# Patient Record
Sex: FEMALE | Race: WHITE | NOT HISPANIC OR LATINO | Employment: FULL TIME | URBAN - METROPOLITAN AREA
[De-identification: names, ages, dates, MRNs, and addresses within clinical notes are randomized per-mention and may not be internally consistent; named-entity substitution may affect disease eponyms.]

---

## 2017-02-14 ENCOUNTER — ALLSCRIPTS OFFICE VISIT (OUTPATIENT)
Dept: OTHER | Facility: OTHER | Age: 57
End: 2017-02-14

## 2017-04-19 ENCOUNTER — GENERIC CONVERSION - ENCOUNTER (OUTPATIENT)
Dept: OTHER | Facility: OTHER | Age: 57
End: 2017-04-19

## 2017-07-14 DIAGNOSIS — Z98.84 BARIATRIC SURGERY STATUS: ICD-10-CM

## 2017-07-14 DIAGNOSIS — Z00.00 ENCOUNTER FOR GENERAL ADULT MEDICAL EXAMINATION WITHOUT ABNORMAL FINDINGS: ICD-10-CM

## 2017-07-14 DIAGNOSIS — I10 ESSENTIAL (PRIMARY) HYPERTENSION: ICD-10-CM

## 2017-07-14 DIAGNOSIS — R73.01 IMPAIRED FASTING GLUCOSE: ICD-10-CM

## 2017-07-14 DIAGNOSIS — K91.2 POSTSURGICAL MALABSORPTION, NOT ELSEWHERE CLASSIFIED: ICD-10-CM

## 2017-07-14 DIAGNOSIS — G47.33 OBSTRUCTIVE SLEEP APNEA: ICD-10-CM

## 2017-08-15 ENCOUNTER — APPOINTMENT (OUTPATIENT)
Dept: LAB | Facility: CLINIC | Age: 57
End: 2017-08-15
Payer: COMMERCIAL

## 2017-08-15 DIAGNOSIS — Z00.00 ENCOUNTER FOR GENERAL ADULT MEDICAL EXAMINATION WITHOUT ABNORMAL FINDINGS: ICD-10-CM

## 2017-08-15 DIAGNOSIS — K91.2 POSTSURGICAL MALABSORPTION, NOT ELSEWHERE CLASSIFIED: ICD-10-CM

## 2017-08-15 DIAGNOSIS — R73.01 IMPAIRED FASTING GLUCOSE: ICD-10-CM

## 2017-08-15 DIAGNOSIS — I10 ESSENTIAL (PRIMARY) HYPERTENSION: ICD-10-CM

## 2017-08-15 DIAGNOSIS — Z98.84 BARIATRIC SURGERY STATUS: ICD-10-CM

## 2017-08-15 DIAGNOSIS — G47.33 OBSTRUCTIVE SLEEP APNEA: ICD-10-CM

## 2017-08-15 LAB
25(OH)D3 SERPL-MCNC: 30.6 NG/ML (ref 30–100)
ALBUMIN SERPL BCP-MCNC: 3.4 G/DL (ref 3.5–5)
ALP SERPL-CCNC: 73 U/L (ref 46–116)
ALT SERPL W P-5'-P-CCNC: 30 U/L (ref 12–78)
ANION GAP SERPL CALCULATED.3IONS-SCNC: 5 MMOL/L (ref 4–13)
AST SERPL W P-5'-P-CCNC: 17 U/L (ref 5–45)
BILIRUB SERPL-MCNC: 0.44 MG/DL (ref 0.2–1)
BUN SERPL-MCNC: 13 MG/DL (ref 5–25)
CALCIUM SERPL-MCNC: 8.9 MG/DL (ref 8.3–10.1)
CHLORIDE SERPL-SCNC: 107 MMOL/L (ref 100–108)
CHOLEST SERPL-MCNC: 148 MG/DL (ref 50–200)
CO2 SERPL-SCNC: 30 MMOL/L (ref 21–32)
CREAT SERPL-MCNC: 0.64 MG/DL (ref 0.6–1.3)
ERYTHROCYTE [DISTWIDTH] IN BLOOD BY AUTOMATED COUNT: 18.7 % (ref 11.6–15.1)
EST. AVERAGE GLUCOSE BLD GHB EST-MCNC: 120 MG/DL
FERRITIN SERPL-MCNC: 10 NG/ML (ref 8–388)
FOLATE SERPL-MCNC: 12.8 NG/ML (ref 3.1–17.5)
GFR SERPL CREATININE-BSD FRML MDRD: 100 ML/MIN/1.73SQ M
GLUCOSE P FAST SERPL-MCNC: 84 MG/DL (ref 65–99)
HBA1C MFR BLD: 5.8 % (ref 4.2–6.3)
HCT VFR BLD AUTO: 38.3 % (ref 34.8–46.1)
HDLC SERPL-MCNC: 58 MG/DL (ref 40–60)
HGB BLD-MCNC: 12.1 G/DL (ref 11.5–15.4)
LDLC SERPL CALC-MCNC: 76 MG/DL (ref 0–100)
MCH RBC QN AUTO: 27.3 PG (ref 26.8–34.3)
MCHC RBC AUTO-ENTMCNC: 31.6 G/DL (ref 31.4–37.4)
MCV RBC AUTO: 87 FL (ref 82–98)
PLATELET # BLD AUTO: 221 THOUSANDS/UL (ref 149–390)
PMV BLD AUTO: 9.5 FL (ref 8.9–12.7)
POTASSIUM SERPL-SCNC: 3.6 MMOL/L (ref 3.5–5.3)
PROT SERPL-MCNC: 7.1 G/DL (ref 6.4–8.2)
PTH-INTACT SERPL-MCNC: 32.9 PG/ML (ref 14–72)
RBC # BLD AUTO: 4.43 MILLION/UL (ref 3.81–5.12)
SODIUM SERPL-SCNC: 142 MMOL/L (ref 136–145)
TRIGL SERPL-MCNC: 71 MG/DL
VIT B12 SERPL-MCNC: 382 PG/ML (ref 100–900)
WBC # BLD AUTO: 4.44 THOUSAND/UL (ref 4.31–10.16)

## 2017-08-15 PROCEDURE — 80053 COMPREHEN METABOLIC PANEL: CPT

## 2017-08-15 PROCEDURE — 83970 ASSAY OF PARATHORMONE: CPT

## 2017-08-15 PROCEDURE — 85027 COMPLETE CBC AUTOMATED: CPT

## 2017-08-15 PROCEDURE — 82306 VITAMIN D 25 HYDROXY: CPT

## 2017-08-15 PROCEDURE — 83036 HEMOGLOBIN GLYCOSYLATED A1C: CPT

## 2017-08-15 PROCEDURE — 36415 COLL VENOUS BLD VENIPUNCTURE: CPT

## 2017-08-15 PROCEDURE — 84590 ASSAY OF VITAMIN A: CPT

## 2017-08-15 PROCEDURE — 80061 LIPID PANEL: CPT

## 2017-08-15 PROCEDURE — 82728 ASSAY OF FERRITIN: CPT

## 2017-08-15 PROCEDURE — 82607 VITAMIN B-12: CPT

## 2017-08-15 PROCEDURE — 84425 ASSAY OF VITAMIN B-1: CPT

## 2017-08-15 PROCEDURE — 82746 ASSAY OF FOLIC ACID SERUM: CPT

## 2017-08-18 LAB
VIT A SERPL-MCNC: 40 UG/DL (ref 20–65)
VIT B1 BLD-SCNC: 100.3 NMOL/L (ref 66.5–200)

## 2017-08-21 ENCOUNTER — ALLSCRIPTS OFFICE VISIT (OUTPATIENT)
Dept: OTHER | Facility: OTHER | Age: 57
End: 2017-08-21

## 2017-09-21 ENCOUNTER — ALLSCRIPTS OFFICE VISIT (OUTPATIENT)
Dept: OTHER | Facility: OTHER | Age: 57
End: 2017-09-21

## 2018-01-09 NOTE — PROGRESS NOTES
Message  Attended 9 month follow up meeting  Addressed both behavioral and dietary issues  Group discussion included the impact of tobacco use, alcohol use, psychiatric medications, relationships, body image and self esteem issues as it pertains to the weight loss surgery patient  During group discussion, reviewed vitamin recommendations, protein recommendations, portion sizes, balancing diet to include healthy carbohydrates, importance of exercise, and the bariatric rules for success  Overall pleased with weight loss and improved quality of life  Active Problems    1  Acute left-sided low back pain without sciatica (724 2) (M54 5)   2  Adjustment disorder with depressed mood (309 0) (F43 21)   3  Benign essential hypertension (401 1) (I10)   4  Colon cancer screening (V76 51) (Z12 11)   5  Foot pain, unspecified laterality   6  Joint pain (719 40) (M25 50)   7  Obesity (278 00) (E66 9)   8  Obesity (BMI 30-39 9) (278 00) (E66 9)   9  Obstructive sleep apnea (327 23) (G47 33)   10  Postgastrectomy malabsorption (579 3) (K91 2,Z90 3)   11  Preop cardiovascular exam (V72 81) (Z01 810)   12  Status post gastric bypass for obesity (V45 86) (Z98 84)    Current Meds   1  Bariatric Fusion Oral Tablet Chewable; Therapy: (Recorded:09Nrz6840) to Recorded   2  Omeprazole 20 MG Oral Capsule Delayed Release; TAKE 1 CAPSULE DAILY; Therapy: 50KWI7014 to (Evaluate:14Tsl8661)  Requested for: 50Kzd5456; Last   Rx:21Qlw8038; Status: ACTIVE - Renewal Denied Ordered    Allergies    1  No Known Drug Allergies    Signatures   Electronically signed by : JUANITA Hwang RD;  Apr 27 2017 12:23PM EST                       (Author)

## 2018-01-10 NOTE — RESULT NOTES
Dear Wil Gomez,   Your test results have returned and are listed below:      Discussion/Summary  Your results show some abnormalities  Your hemoglobin A1c was 5 7% ( average blood sugar of 117 mg/dl), indicating pre diabetes   Weight loss surgery should improve this condition  Your lab levels will be checked again after surgery  Your other levels were within acceptable limits  Please keep your regularly scheduled follow-up appointment  If you do not have a follow-up scheduled, please call the office to schedule a follow-up visit  If you have any questions, please don't hesitate to call the office     Sincerely,      Signatures   Electronically signed by : Claudette Abler, LD; Apr 15 2016  3:03PM EST                       (Author)    Electronically signed by : MATT Esparza ; Apr 21 2016 12:20PM EST                       (Validation)

## 2018-01-12 NOTE — PROGRESS NOTES
Message  Patient called and requested clarification on the D/A evaluation  NV      Active Problems    1  Adjustment disorder with depressed mood (309 0) (F43 21)   2  Benign essential hypertension (401 1) (I10)   3  Foot pain, unspecified laterality (729 5) (M79 673)   4  Impaired fasting glucose (790 21) (R73 01)   5  Joint pain (719 40) (M25 50)   6  Morbid obesity (278 01) (E66 01)   7  Obesity (278 00) (E66 9)   8  Obstructive sleep apnea (327 23) (G47 33)    Current Meds   1  Diclofenac Sodium 75 MG Oral Tablet Delayed Release; take 1 tablet twice a day; Therapy: 35PAB6755 to (Evaluate:59Ohd4194)  Requested for: 20Wba0650; Last   Rx:19Nfj5923 Ordered   2  Lisinopril 40 MG Oral Tablet; Take 1 tablet daily; Therapy: 86THZ1286 to (Evaluate:90Hme3777)  Requested for: 64Zcl8127; Last   Rx:82Can2354 Ordered    Allergies    1   No Known Drug Allergies    Signatures   Electronically signed by : LALIT Bruce; Jan 14 2016  3:25PM EST                       (Author)

## 2018-01-12 NOTE — PROGRESS NOTES
Message  D/A received and reviewed  Patient cleared by evaluator  I called patient to inform her our office received evaluation and patient is cleared to proceed  I mentioned to patient coordiantor will reach out to discuss next step  NV      Active Problems    1  Adjustment disorder with depressed mood (309 0) (F43 21)   2  Benign essential hypertension (401 1) (I10)   3  Foot pain, unspecified laterality (729 5) (M79 673)   4  Impaired fasting glucose (790 21) (R73 01)   5  Joint pain (719 40) (M25 50)   6  Morbid obesity (278 01) (E66 01)   7  Obesity (278 00) (E66 9)   8  Obstructive sleep apnea (327 23) (G47 33)    Current Meds   1  Diclofenac Sodium 75 MG Oral Tablet Delayed Release; take 1 tablet twice a day; Therapy: 49MSY4310 to (Evaluate:22Kqh1637)  Requested for: 01Jbx9797; Last   Rx:63Jpg0942 Ordered   2  Lisinopril 40 MG Oral Tablet; Take 1 tablet daily; Therapy: 70MMY6557 to (Evaluate:33Qge1980)  Requested for: 41Pqv2399; Last   Rx:24Ehk4467 Ordered    Allergies    1   No Known Drug Allergies    Signatures   Electronically signed by : Ronnell Ayala MSWLCFILIPE; Feb  3 2016 11:46AM EST                       (Author)

## 2018-01-13 VITALS
BODY MASS INDEX: 27.56 KG/M2 | SYSTOLIC BLOOD PRESSURE: 132 MMHG | HEIGHT: 66 IN | WEIGHT: 171.5 LBS | TEMPERATURE: 98.4 F | HEART RATE: 71 BPM | DIASTOLIC BLOOD PRESSURE: 78 MMHG | OXYGEN SATURATION: 97 %

## 2018-01-13 VITALS
HEART RATE: 70 BPM | WEIGHT: 175 LBS | BODY MASS INDEX: 28.12 KG/M2 | RESPIRATION RATE: 18 BRPM | TEMPERATURE: 98 F | HEIGHT: 66 IN | DIASTOLIC BLOOD PRESSURE: 70 MMHG | SYSTOLIC BLOOD PRESSURE: 122 MMHG

## 2018-01-14 VITALS
BODY MASS INDEX: 32.62 KG/M2 | WEIGHT: 203 LBS | RESPIRATION RATE: 18 BRPM | HEART RATE: 72 BPM | DIASTOLIC BLOOD PRESSURE: 70 MMHG | TEMPERATURE: 98.9 F | SYSTOLIC BLOOD PRESSURE: 130 MMHG | HEIGHT: 66 IN

## 2018-01-14 NOTE — PROGRESS NOTES
Message  Outreach phone call How is patient coming along with pre-op weight loss and liquid diet? Patient said she is doing fine and no questions or concerns  Remind patient she will be weighed the morning of surgery and encouraged to reach out to staff if in need of support  NV      Active Problems    1  Acute left-sided low back pain without sciatica (724 2) (M54 5)   2  Adjustment disorder with depressed mood (309 0) (F43 21)   3  Benign essential hypertension (401 1) (I10)   4  Foot pain, unspecified laterality   5  Impaired fasting glucose (790 21) (R73 01)   6  Joint pain (719 40) (M25 50)   7  Morbid obesity (278 01) (E66 01)   8  Obesity (278 00) (E66 9)   9  Obstructive sleep apnea (327 23) (G47 33)   10  Preop cardiovascular exam (V72 81) (Z01 810)    Current Meds   1  Diclofenac Sodium 75 MG Oral Tablet Delayed Release; take 1 tablet twice a day; Therapy: 81KWW3792 to (Evaluate:51Hqr6010)  Requested for: 89Qvb3926; Last   Rx:62Rar4617 Ordered   2  Lisinopril 40 MG Oral Tablet; Take 1 tablet daily; Therapy: 71JRF4631 to (Last Rx:98Lgh5251)  Requested for: 38Xxp2671 Ordered   3  Omeprazole 20 MG Oral Capsule Delayed Release; TAKE 1 CAPSULE DAILY; Therapy: 63GBQ7568 to (Jc Dao)  Requested for: 93FEU6208; Last   Rx:16Khn4886 Ordered    Allergies    1   No Known Drug Allergies    Signatures   Electronically signed by : LALIT Katz; Jul 20 2016  8:31AM EST                       (Author)

## 2018-01-16 NOTE — RESULT NOTES
Dear Severino Fay,   Your test results have returned and are listed below:      Discussion/Summary  Your labs are all within acceptable limits  Your hemoglobin A1c is now normal, and you are no longer prediabetic  Please keep your regularly scheduled follow-up appointment  If you do not have a follow-up scheduled, please call the office to schedule a follow-up visit  If you have any questions, please don't hesitate to call the office     Sincerely,      Signatures   Electronically signed by : RIAZ Covington; Nov 4 2016  3:18PM EST                       (Author)    Electronically signed by : MATT Wilks ; Nov 10 2016 10:50AM EST                       (Validation)

## 2018-01-16 NOTE — PROGRESS NOTES
Message  Outreach phone call; how is patient coming along with D/A evaluation? She scheduled appointment for 2/3/44 with [facility]  I requested patient sign release and have them forward evaluation to our office  NV      Active Problems    1  Adjustment disorder with depressed mood (309 0) (F43 21)   2  Benign essential hypertension (401 1) (I10)   3  Foot pain, unspecified laterality (729 5) (M79 673)   4  Impaired fasting glucose (790 21) (R73 01)   5  Joint pain (719 40) (M25 50)   6  Morbid obesity (278 01) (E66 01)   7  Obesity (278 00) (E66 9)   8  Obstructive sleep apnea (327 23) (G47 33)    Current Meds   1  Diclofenac Sodium 75 MG Oral Tablet Delayed Release; take 1 tablet twice a day; Therapy: 54NYQ4945 to (Evaluate:05Tau2454)  Requested for: 57Nsn8380; Last   Rx:96Ykt6179 Ordered   2  Lisinopril 40 MG Oral Tablet; Take 1 tablet daily; Therapy: 12WIZ3512 to (Evaluate:14Lqd3123)  Requested for: 51Zyi3746; Last   Rx:08Tno8344 Ordered    Allergies    1   No Known Drug Allergies    Signatures   Electronically signed by : LALIT Jeffries; Jan 22 2016 11:20AM EST                       (Author)

## 2018-01-17 NOTE — MISCELLANEOUS
Assessment    1  Morbid obesity (278 01) (E66 01)   2  Benign essential hypertension (401 1) (I10)   3  Status post gastric bypass for obesity (V45 86) (Z33 72)    Discussion/Summary  Discussion Summary:   1) Obesity she has lost 26 lbs since her surgery  I encouraged her to do small portions  2) sp gastric bypass  doing well post op  She will follow with her surgeons and will have lab evaluation for hypovitaminosis periodically  3) HTN nl bp off meds will follow off meds  4) José Miguel continue with cpap  Counseling Documentation With Imm: The patient was counseled regarding instructions for management, risk factor reductions  Chief Complaint  Chief Complaint Free Text Note Form: Follow up Gastric bypass      History of Present Illness  TCM Communication [facility]: The patient is being contacted for follow-up after hospitalization  She was hospitalized at [place] [address]  The date of discharge: 7/27/16  She was discharged to home  Medications reviewed and updated today  Communication performed and completed by   HPI: Patient presents about 13 days after her recent chuck en y for obesity  She had a rough time post op but she feels better each day  She mostly dealt with problems with pain and had a lot of bloating and gas when she ate milk products  She is getting used to her change in eating habits  She has been off her bp meds  Review of Systems  Complete-Female:   Constitutional: as noted in HPI  Eyes: No complaints of eye pain, no red eyes, no eyesight problems, no discharge, no dry eyes, no itching of eyes  ENT: no complaints of earache, no loss of hearing, no nose bleeds, no nasal discharge, no sore throat, no hoarseness  Cardiovascular: No complaints of slow heart rate, no fast heart rate, no chest pain, no palpitations, no leg claudication, no lower extremity edema  Respiratory: No complaints of shortness of breath, no wheezing, no cough, no SOB on exertion, no orthopnea, no PND     Gastrointestinal: as noted in HPI  Genitourinary: No complaints of dysuria, no incontinence, no pelvic pain, no dysmenorrhea, no vaginal discharge or bleeding  Musculoskeletal: No complaints of arthralgias, no myalgias, no joint swelling or stiffness, no limb pain or swelling  Integumentary: No complaints of skin rash or lesions, no itching, no skin wounds, no breast pain or lump  Neurological: No complaints of headache, no confusion, no convulsions, no numbness, no dizziness or fainting, no tingling, no limb weakness, no difficulty walking  Psychiatric: Not suicidal, no sleep disturbance, no anxiety or depression, no change in personality, no emotional problems  Endocrine: No complaints of proptosis, no hot flashes, no muscle weakness, no deepening of the voice, no feelings of weakness  Hematologic/Lymphatic: No complaints of swollen glands, no swollen glands in the neck, does not bleed easily, does not bruise easily  ROS Reviewed:   ROS reviewed  Active Problems    1  Acute left-sided low back pain without sciatica (724 2) (M54 5)   2  Adjustment disorder with depressed mood (309 0) (F43 21)   3  Benign essential hypertension (401 1) (I10)   4  Foot pain, unspecified laterality   5  Impaired fasting glucose (790 21) (R73 01)   6  Joint pain (719 40) (M25 50)   7  Morbid obesity (278 01) (E66 01)   8  Obesity (278 00) (E66 9)   9  Obstructive sleep apnea (327 23) (G47 33)   10  Preop cardiovascular exam (V72 81) (Z01 810)    Past Medical History    1  History of Encounter for screening mammogram for malignant neoplasm of breast   (V76 12) (Z12 31)   2  History of hypertension (V12 59) (Z86 79)   3  History of obesity (V13 89) (Z86 39)   4  History of obesity (V13 89) (Z86 39)   5  History of sleep apnea (V13 89) (Z87 09)    Surgical History    1  History of  Section  Surgical History Reviewed: The surgical history was reviewed and updated today  Family History  Mother    1   No pertinent family history  Family History    2  No pertinent family history  Family History Reviewed: The family history was reviewed and updated today  Social History    · Denied: History of Drug Use   · Former smoker (V15 82) (M07 728)   · Lives with spouse   ·    · No alcohol use   · No drug use   · Occupation  Social History Reviewed: The social history was reviewed and updated today  Current Meds   1  Diclofenac Sodium 75 MG Oral Tablet Delayed Release; take 1 tablet twice a day; Therapy: 33LFN8511 to (Evaluate:10Zmk7853)  Requested for: 75Ght6710; Last   Rx:22Eof6686 Ordered   2  Lisinopril 40 MG Oral Tablet; Take 1 tablet daily; Therapy: 08OQG4260 to (Last Rx:06Jun2016)  Requested for: 06Jun2016 Ordered   3  Omeprazole 20 MG Oral Capsule Delayed Release; TAKE 1 CAPSULE DAILY; Therapy: 03SUE7913 to (Suki Flair)  Requested for: 30XTO6964; Last   Rx:46Jdp5338 Ordered  Medication List Reviewed: The medication list was reviewed and updated today  Allergies    1  No Known Drug Allergies    Physical Exam    Constitutional   General appearance: No acute distress, well appearing and well nourished  Eyes   Conjunctiva and lids: No swelling, erythema or discharge  Pupils and irises: Equal, round and reactive to light  Ears, Nose, Mouth, and Throat   External inspection of ears and nose: Normal     Otoscopic examination: Tympanic membranes translucent with normal light reflex  Canals patent without erythema  Nasal mucosa, septum, and turbinates: Normal without edema or erythema  Oropharynx: Normal with no erythema, edema, exudate or lesions  Pulmonary   Respiratory effort: No increased work of breathing or signs of respiratory distress  Auscultation of lungs: Clear to auscultation  Cardiovascular   Palpation of heart: Normal PMI, no thrills  Auscultation of heart: Normal rate and rhythm, normal S1 and S2, without murmurs      Examination of extremities for edema and/or varicosities: Normal     Carotid pulses: Normal     Abdomen   Abdomen: Non-tender, no masses  Liver and spleen: No hepatomegaly or splenomegaly  Lymphatic   Palpation of lymph nodes in neck: No lymphadenopathy  Musculoskeletal   Gait and station: Normal     Digits and nails: Normal without clubbing or cyanosis  Inspection/palpation of joints, bones, and muscles: Normal     Skin   Skin and subcutaneous tissue: Normal without rashes or lesions  Neurologic   Cranial nerves: Cranial nerves 2-12 intact  Reflexes: 2+ and symmetric  Sensation: No sensory loss  Psychiatric   Orientation to person, place, and time: Normal     Mood and affect: Normal          Health Management  Health Maintenance   COLONOSCOPY; every 1 year; Last 59IAL7989; Next Due: 26GXD8968; Overdue  Digital Bilateral Diagnostic Mammogram With CAD; every 1 year; Next Due: 58Wie7269; Overdue    Future Appointments    Date/Time Provider Specialty Site   11/21/2016 05:30 PM [facility] Palmer   [facility]   09/13/2016 03:00 PM Mally Parisi, [facility] General Surgery [facility]     Signatures   Electronically signed by : MATT Branch ; Aug 10 2016 12:15PM EST                       (Author)

## 2018-01-18 NOTE — MISCELLANEOUS
Message  7/29/2016 @ 1100 - post op follow up phone call completed  Pt is sipping liquids, using IS as instructed, reinforced importance of using IS to help prevent pneumonia  Ambulating about home without difficulty  Pain controlled with analgesia  Reaffirmed examples of liquid diet over the next week  Pt stated understanding about discharge instructions and medication adjustments  Pt educated on [address] [facility]  Follow up appt with surgeon scheduled for next week  Instructed to call with any additional questions or concerns  Active Problems    1  Acute left-sided low back pain without sciatica (724 2) (M54 5)   2  Adjustment disorder with depressed mood (309 0) (F43 21)   3  Benign essential hypertension (401 1) (I10)   4  Foot pain, unspecified laterality   5  Impaired fasting glucose (790 21) (R73 01)   6  Joint pain (719 40) (M25 50)   7  Morbid obesity (278 01) (E66 01)   8  Obesity (278 00) (E66 9)   9  Obstructive sleep apnea (327 23) (G47 33)   10  Preop cardiovascular exam (V72 81) (Z01 810)    Current Meds   1  Diclofenac Sodium 75 MG Oral Tablet Delayed Release; take 1 tablet twice a day; Therapy: 48IWU6418 to (Evaluate:93Zjc0329)  Requested for: 70Pco2329; Last   Rx:49Omw3221 Ordered   2  Lisinopril 40 MG Oral Tablet; Take 1 tablet daily; Therapy: 37TFU6672 to (Last Rx:97Vfa4158)  Requested for: 06Jun2016 Ordered   3  Omeprazole 20 MG Oral Capsule Delayed Release; TAKE 1 CAPSULE DAILY; Therapy: 15IPA4676 to (Mckenzie Shields)  Requested for: 83EEE7951; Last   Rx:30Tkd5402 Ordered    Allergies    1   No Known Drug Allergies    Signatures   Electronically signed by : Unique Hummel, ; Jul 29 2016 11:12AM EST                       (Author)

## 2018-03-15 ENCOUNTER — OFFICE VISIT (OUTPATIENT)
Dept: INTERNAL MEDICINE CLINIC | Facility: CLINIC | Age: 58
End: 2018-03-15
Payer: COMMERCIAL

## 2018-03-15 VITALS
HEART RATE: 62 BPM | TEMPERATURE: 98.2 F | BODY MASS INDEX: 29.49 KG/M2 | DIASTOLIC BLOOD PRESSURE: 78 MMHG | WEIGHT: 177 LBS | OXYGEN SATURATION: 99 % | SYSTOLIC BLOOD PRESSURE: 148 MMHG | HEIGHT: 65 IN

## 2018-03-15 DIAGNOSIS — J02.9 PHARYNGITIS, UNSPECIFIED ETIOLOGY: ICD-10-CM

## 2018-03-15 DIAGNOSIS — J06.9 VIRAL UPPER RESPIRATORY TRACT INFECTION: ICD-10-CM

## 2018-03-15 DIAGNOSIS — E66.01 MORBID (SEVERE) OBESITY DUE TO EXCESS CALORIES (HCC): ICD-10-CM

## 2018-03-15 DIAGNOSIS — I10 BENIGN ESSENTIAL HYPERTENSION: ICD-10-CM

## 2018-03-15 DIAGNOSIS — B35.9 TINEA: Primary | ICD-10-CM

## 2018-03-15 LAB — S PYO AG THROAT QL: NEGATIVE

## 2018-03-15 PROCEDURE — 99214 OFFICE O/P EST MOD 30 MIN: CPT | Performed by: PHYSICIAN ASSISTANT

## 2018-03-15 PROCEDURE — 87880 STREP A ASSAY W/OPTIC: CPT | Performed by: PHYSICIAN ASSISTANT

## 2018-03-15 RX ORDER — CLOTRIMAZOLE AND BETAMETHASONE DIPROPIONATE 10; .64 MG/G; MG/G
CREAM TOPICAL 2 TIMES DAILY
Qty: 30 G | Refills: 3 | Status: SHIPPED | OUTPATIENT
Start: 2018-03-15 | End: 2021-01-12

## 2018-03-15 RX ORDER — AZITHROMYCIN 250 MG/1
TABLET, FILM COATED ORAL
Qty: 6 TABLET | Refills: 0 | Status: SHIPPED | OUTPATIENT
Start: 2018-03-15 | End: 2018-03-19

## 2018-03-15 NOTE — PROGRESS NOTES
Assessment/Plan:  Empiric antibiotics she will continue with over-the-counter cold remedies  She will return if she worsens or gets wheezy  Her weight is staying down pretty well after her bariatric surgery  Her life is returning to normal following the death of her   No problem-specific Assessment & Plan notes found for this encounter  Diagnoses and all orders for this visit:    Tinea  -     clotrimazole-betamethasone (LOTRISONE) 1-0 05 % cream; Apply topically 2 (two) times a day    Viral upper respiratory tract infection  -     azithromycin (ZITHROMAX) 250 mg tablet; Take 2 tablets day 1 then  1 a day for 4 days    Benign essential hypertension  -     CBC and differential; Future  -     Comprehensive metabolic panel; Future  -     HEMOGLOBIN A1C W/ EAG ESTIMATION; Future  -     Lipid panel; Future  -     TSH, 3rd generation; Future  -     UA w Reflex to Microscopic w Reflex to Culture; Future    Morbid (severe) obesity due to excess calories (HCC)  -     CBC and differential; Future  -     Comprehensive metabolic panel; Future  -     HEMOGLOBIN A1C W/ EAG ESTIMATION; Future  -     Lipid panel; Future  -     TSH, 3rd generation; Future  -     UA w Reflex to Microscopic w Reflex to Culture; Future    Pharyngitis, unspecified etiology  -     POCT rapid strepA    Other orders  -     Multiple Vitamins-Minerals (BARIATRIC FUSION PO); Take by mouth  -     POCT rapid strepA          Subjective:      Patient ID: Eliu Fowler is a 65 y o  female  Fever aching sore throat started abruptly 5 days ago she now has a loose cough of greenish phlegm no shortness of breath or wheezing sore throat improving a little she is now somewhat hoarse  No trouble swallowing no chest pain or hemoptysis fever and aching or improving      Sore Throat    Associated symptoms include ear pain   Pertinent negatives include no abdominal pain, congestion, coughing, diarrhea, drooling, ear discharge, headaches, neck pain, shortness of breath, stridor, trouble swallowing or vomiting  Earache    Associated symptoms include a sore throat  Pertinent negatives include no abdominal pain, coughing, diarrhea, ear discharge, headaches, hearing loss, neck pain, rash, rhinorrhea or vomiting  The following portions of the patient's history were reviewed and updated as appropriate: allergies, current medications, past family history, past medical history, past social history, past surgical history and problem list     Review of Systems   Constitutional: Negative for activity change, appetite change, chills, diaphoresis, fatigue, fever and unexpected weight change  HENT: Positive for ear pain and sore throat  Negative for congestion, dental problem, drooling, ear discharge, facial swelling, hearing loss, nosebleeds, postnasal drip, rhinorrhea, sinus pain, sinus pressure, sneezing, tinnitus, trouble swallowing and voice change  Eyes: Negative for photophobia, pain, discharge, redness, itching and visual disturbance  Respiratory: Negative for apnea, cough, choking, chest tightness, shortness of breath, wheezing and stridor  Cardiovascular: Negative for chest pain, palpitations and leg swelling  Gastrointestinal: Negative for abdominal distention, abdominal pain, anal bleeding, blood in stool, constipation, diarrhea, nausea, rectal pain and vomiting  Endocrine: Negative for cold intolerance, heat intolerance, polydipsia, polyphagia and polyuria  Genitourinary: Negative for decreased urine volume, difficulty urinating, dysuria, enuresis, flank pain, frequency, genital sores, hematuria and urgency  Musculoskeletal: Negative for arthralgias, back pain, gait problem, joint swelling, myalgias, neck pain and neck stiffness  Skin: Negative for color change, pallor, rash and wound     Neurological: Negative for dizziness, tremors, seizures, syncope, facial asymmetry, speech difficulty, weakness, light-headedness, numbness and headaches  Hematological: Negative for adenopathy  Does not bruise/bleed easily  Psychiatric/Behavioral: Negative for agitation, behavioral problems, confusion, decreased concentration, dysphoric mood, hallucinations, self-injury, sleep disturbance and suicidal ideas  The patient is not nervous/anxious and is not hyperactive  Objective:    /78 (BP Location: Left arm, Patient Position: Sitting)   Pulse 62   Temp 98 2 °F (36 8 °C)   Ht 5' 5" (1 651 m)   Wt 80 3 kg (177 lb)   SpO2 99%   BMI 29 45 kg/m²      Physical Exam   Constitutional: She is oriented to person, place, and time  She appears well-developed  HENT:   Head: Normocephalic  Right Ear: External ear normal    Left Ear: External ear normal    Mouth/Throat: Oropharynx is clear and moist  Oropharyngeal exudate: Somewhat injected  Eyes: Conjunctivae are normal  Pupils are equal, round, and reactive to light  Neck: Neck supple  No thyromegaly present  Cardiovascular: Normal rate, regular rhythm, normal heart sounds and intact distal pulses  Pulmonary/Chest: Effort normal and breath sounds normal  No respiratory distress (Few scattered rhonchi)  She has no wheezes  She has no rales  She exhibits no tenderness  Abdominal: Soft  Bowel sounds are normal    Musculoskeletal: Normal range of motion  Neurological: She is alert and oriented to person, place, and time  She has normal reflexes  Skin: Skin is warm and dry     Fungal changes both toenails

## 2018-05-16 ENCOUNTER — TELEPHONE (OUTPATIENT)
Dept: BARIATRICS | Facility: CLINIC | Age: 58
End: 2018-05-16

## 2018-05-16 NOTE — TELEPHONE ENCOUNTER
LM for patient to call back if she would like to schedule her follow-up appointments at the [facility] office

## 2018-07-26 ENCOUNTER — TELEPHONE (OUTPATIENT)
Dept: BARIATRICS | Facility: CLINIC | Age: 58
End: 2018-07-26

## 2018-08-17 ENCOUNTER — TELEPHONE (OUTPATIENT)
Dept: BARIATRICS | Facility: CLINIC | Age: 58
End: 2018-08-17

## 2018-08-22 ENCOUNTER — APPOINTMENT (OUTPATIENT)
Dept: LAB | Facility: CLINIC | Age: 58
End: 2018-08-22
Payer: COMMERCIAL

## 2018-08-22 DIAGNOSIS — E66.01 MORBID (SEVERE) OBESITY DUE TO EXCESS CALORIES (HCC): ICD-10-CM

## 2018-08-22 DIAGNOSIS — I10 BENIGN ESSENTIAL HYPERTENSION: ICD-10-CM

## 2018-08-22 LAB
ALBUMIN SERPL BCP-MCNC: 3.5 G/DL (ref 3.5–5)
ALP SERPL-CCNC: 69 U/L (ref 46–116)
ALT SERPL W P-5'-P-CCNC: 26 U/L (ref 12–78)
ANION GAP SERPL CALCULATED.3IONS-SCNC: 5 MMOL/L (ref 4–13)
AST SERPL W P-5'-P-CCNC: 17 U/L (ref 5–45)
BACTERIA UR QL AUTO: ABNORMAL /HPF
BASOPHILS # BLD AUTO: 0.04 THOUSANDS/ΜL (ref 0–0.1)
BASOPHILS NFR BLD AUTO: 1 % (ref 0–1)
BILIRUB SERPL-MCNC: 0.35 MG/DL (ref 0.2–1)
BILIRUB UR QL STRIP: NEGATIVE
BUN SERPL-MCNC: 13 MG/DL (ref 5–25)
CALCIUM SERPL-MCNC: 8.6 MG/DL (ref 8.3–10.1)
CHLORIDE SERPL-SCNC: 103 MMOL/L (ref 100–108)
CHOLEST SERPL-MCNC: 172 MG/DL (ref 50–200)
CLARITY UR: CLEAR
CO2 SERPL-SCNC: 30 MMOL/L (ref 21–32)
COLOR UR: YELLOW
CREAT SERPL-MCNC: 0.66 MG/DL (ref 0.6–1.3)
EOSINOPHIL # BLD AUTO: 0.09 THOUSAND/ΜL (ref 0–0.61)
EOSINOPHIL NFR BLD AUTO: 2 % (ref 0–6)
ERYTHROCYTE [DISTWIDTH] IN BLOOD BY AUTOMATED COUNT: 14.2 % (ref 11.6–15.1)
EST. AVERAGE GLUCOSE BLD GHB EST-MCNC: 114 MG/DL
GFR SERPL CREATININE-BSD FRML MDRD: 98 ML/MIN/1.73SQ M
GLUCOSE P FAST SERPL-MCNC: 82 MG/DL (ref 65–99)
GLUCOSE UR STRIP-MCNC: NEGATIVE MG/DL
HBA1C MFR BLD: 5.6 % (ref 4.2–6.3)
HCT VFR BLD AUTO: 40.2 % (ref 34.8–46.1)
HDLC SERPL-MCNC: 59 MG/DL (ref 40–60)
HGB BLD-MCNC: 12.7 G/DL (ref 11.5–15.4)
HGB UR QL STRIP.AUTO: NEGATIVE
HYALINE CASTS #/AREA URNS LPF: ABNORMAL /LPF
IMM GRANULOCYTES # BLD AUTO: 0.01 THOUSAND/UL (ref 0–0.2)
IMM GRANULOCYTES NFR BLD AUTO: 0 % (ref 0–2)
KETONES UR STRIP-MCNC: NEGATIVE MG/DL
LDLC SERPL CALC-MCNC: 95 MG/DL (ref 0–100)
LEUKOCYTE ESTERASE UR QL STRIP: ABNORMAL
LYMPHOCYTES # BLD AUTO: 2.14 THOUSANDS/ΜL (ref 0.6–4.47)
LYMPHOCYTES NFR BLD AUTO: 42 % (ref 14–44)
MCH RBC QN AUTO: 28.2 PG (ref 26.8–34.3)
MCHC RBC AUTO-ENTMCNC: 31.6 G/DL (ref 31.4–37.4)
MCV RBC AUTO: 89 FL (ref 82–98)
MONOCYTES # BLD AUTO: 0.39 THOUSAND/ΜL (ref 0.17–1.22)
MONOCYTES NFR BLD AUTO: 8 % (ref 4–12)
NEUTROPHILS # BLD AUTO: 2.39 THOUSANDS/ΜL (ref 1.85–7.62)
NEUTS SEG NFR BLD AUTO: 47 % (ref 43–75)
NITRITE UR QL STRIP: NEGATIVE
NON-SQ EPI CELLS URNS QL MICRO: ABNORMAL /HPF
NONHDLC SERPL-MCNC: 113 MG/DL
NRBC BLD AUTO-RTO: 0 /100 WBCS
PH UR STRIP.AUTO: 6.5 [PH] (ref 4.5–8)
PLATELET # BLD AUTO: 251 THOUSANDS/UL (ref 149–390)
PMV BLD AUTO: 9.3 FL (ref 8.9–12.7)
POTASSIUM SERPL-SCNC: 3.7 MMOL/L (ref 3.5–5.3)
PROT SERPL-MCNC: 7.1 G/DL (ref 6.4–8.2)
PROT UR STRIP-MCNC: NEGATIVE MG/DL
RBC # BLD AUTO: 4.5 MILLION/UL (ref 3.81–5.12)
RBC #/AREA URNS AUTO: ABNORMAL /HPF
SODIUM SERPL-SCNC: 138 MMOL/L (ref 136–145)
SP GR UR STRIP.AUTO: 1.02 (ref 1–1.03)
TRIGL SERPL-MCNC: 89 MG/DL
TSH SERPL DL<=0.05 MIU/L-ACNC: 2.8 UIU/ML (ref 0.36–3.74)
UROBILINOGEN UR QL STRIP.AUTO: 0.2 E.U./DL
WBC # BLD AUTO: 5.06 THOUSAND/UL (ref 4.31–10.16)
WBC #/AREA URNS AUTO: ABNORMAL /HPF

## 2018-08-22 PROCEDURE — 81001 URINALYSIS AUTO W/SCOPE: CPT

## 2018-08-22 PROCEDURE — 84443 ASSAY THYROID STIM HORMONE: CPT

## 2018-08-22 PROCEDURE — 83036 HEMOGLOBIN GLYCOSYLATED A1C: CPT

## 2018-08-22 PROCEDURE — 36415 COLL VENOUS BLD VENIPUNCTURE: CPT

## 2018-08-22 PROCEDURE — 85025 COMPLETE CBC W/AUTO DIFF WBC: CPT

## 2018-08-22 PROCEDURE — 80053 COMPREHEN METABOLIC PANEL: CPT

## 2018-08-22 PROCEDURE — 80061 LIPID PANEL: CPT

## 2018-08-28 ENCOUNTER — TRANSCRIBE ORDERS (OUTPATIENT)
Dept: LAB | Facility: CLINIC | Age: 58
End: 2018-08-28

## 2018-08-28 ENCOUNTER — APPOINTMENT (OUTPATIENT)
Dept: LAB | Facility: CLINIC | Age: 58
End: 2018-08-28
Payer: COMMERCIAL

## 2018-08-28 ENCOUNTER — OFFICE VISIT (OUTPATIENT)
Dept: INTERNAL MEDICINE CLINIC | Facility: CLINIC | Age: 58
End: 2018-08-28
Payer: COMMERCIAL

## 2018-08-28 VITALS
SYSTOLIC BLOOD PRESSURE: 124 MMHG | HEART RATE: 69 BPM | DIASTOLIC BLOOD PRESSURE: 72 MMHG | WEIGHT: 186 LBS | RESPIRATION RATE: 16 BRPM | OXYGEN SATURATION: 99 % | BODY MASS INDEX: 30.99 KG/M2 | HEIGHT: 65 IN

## 2018-08-28 DIAGNOSIS — E66.01 MORBID (SEVERE) OBESITY DUE TO EXCESS CALORIES (HCC): ICD-10-CM

## 2018-08-28 DIAGNOSIS — I10 BENIGN ESSENTIAL HYPERTENSION: Primary | ICD-10-CM

## 2018-08-28 DIAGNOSIS — Z98.84 BARIATRIC SURGERY STATUS: Primary | ICD-10-CM

## 2018-08-28 DIAGNOSIS — E66.01 MORBID OBESITY (HCC): ICD-10-CM

## 2018-08-28 DIAGNOSIS — Z98.84 BARIATRIC SURGERY STATUS: ICD-10-CM

## 2018-08-28 DIAGNOSIS — Z12.11 SCREEN FOR COLON CANCER: ICD-10-CM

## 2018-08-28 LAB
25(OH)D3 SERPL-MCNC: 22.1 NG/ML (ref 30–100)
FERRITIN SERPL-MCNC: 7 NG/ML (ref 8–388)
FOLATE SERPL-MCNC: 16.3 NG/ML (ref 3.1–17.5)
PTH-INTACT SERPL-MCNC: 55.4 PG/ML (ref 18.4–80.1)
TSH SERPL DL<=0.05 MIU/L-ACNC: 1.25 UIU/ML (ref 0.36–3.74)
VIT B12 SERPL-MCNC: 406 PG/ML (ref 100–900)

## 2018-08-28 PROCEDURE — 82746 ASSAY OF FOLIC ACID SERUM: CPT

## 2018-08-28 PROCEDURE — 84425 ASSAY OF VITAMIN B-1: CPT

## 2018-08-28 PROCEDURE — 84590 ASSAY OF VITAMIN A: CPT

## 2018-08-28 PROCEDURE — 82728 ASSAY OF FERRITIN: CPT

## 2018-08-28 PROCEDURE — 83970 ASSAY OF PARATHORMONE: CPT

## 2018-08-28 PROCEDURE — 3008F BODY MASS INDEX DOCD: CPT | Performed by: INTERNAL MEDICINE

## 2018-08-28 PROCEDURE — 3074F SYST BP LT 130 MM HG: CPT | Performed by: INTERNAL MEDICINE

## 2018-08-28 PROCEDURE — 84443 ASSAY THYROID STIM HORMONE: CPT

## 2018-08-28 PROCEDURE — 36415 COLL VENOUS BLD VENIPUNCTURE: CPT

## 2018-08-28 PROCEDURE — 3078F DIAST BP <80 MM HG: CPT | Performed by: INTERNAL MEDICINE

## 2018-08-28 PROCEDURE — 82607 VITAMIN B-12: CPT

## 2018-08-28 PROCEDURE — 99214 OFFICE O/P EST MOD 30 MIN: CPT | Performed by: INTERNAL MEDICINE

## 2018-08-28 PROCEDURE — 82306 VITAMIN D 25 HYDROXY: CPT

## 2018-08-28 NOTE — PROGRESS NOTES
Assessment/Plan:    Screen for colon cancer  Will order fit    Benign essential hypertension  Well controlled bmp wnl  Morbid (severe) obesity due to excess calories ([facility] [address] )  Will order additional studies for her bariatric team who she will see in one week,        Diagnoses and all orders for this visit:    Benign essential hypertension    Morbid (severe) obesity due to excess calories ([facility] [address] )  -     Vitamin B12; Future  -     TSH, 3rd generation; Future  -     Vitamin D 25 hydroxy; Future  -     Vitamin A; Future  -     Folate; Future  -     Ferritin; Future  -     Vitamin B1 (Thiamine), Serum/Plasma, LC/MS/MS; Future  -     PTH, intact; Future    Screen for colon cancer  -     Occult Blood, Fecal Immunochemical; Future    Bariatric surgery status  -     Vitamin B12; Future  -     TSH, 3rd generation; Future  -     Vitamin D 25 hydroxy; Future  -     Vitamin A; Future  -     Folate; Future  -     Ferritin; Future  -     Vitamin B1 (Thiamine), Serum/Plasma, LC/MS/MS; Future  -     PTH, intact; Future          Subjective:      Patient ID: Dannie Erickson is a 63 y o  female  Patient presents in follow up for her medical problems and to review recent lab work  She had a chuck en y weight reduction surgery  She feels great but is easily satiated  She has lost over 107 lbs! The following portions of the patient's history were reviewed and updated as appropriate: allergies, current medications, past family history, past medical history, past social history, past surgical history and problem list     Review of Systems   Constitutional: Negative for activity change, appetite change, chills, diaphoresis, fatigue, fever and unexpected weight change  HENT: Negative for congestion, dental problem, drooling, ear discharge, ear pain, facial swelling, hearing loss, mouth sores, nosebleeds, postnasal drip, rhinorrhea, sinus pain, sinus pressure, sneezing, sore throat, tinnitus, trouble swallowing and voice change  Eyes: Negative for photophobia, pain, discharge, redness, itching and visual disturbance  Respiratory: Negative for apnea, cough, choking, chest tightness, shortness of breath, wheezing and stridor  Cardiovascular: Negative for chest pain, palpitations and leg swelling  Gastrointestinal: Negative for abdominal distention, abdominal pain, anal bleeding, blood in stool, constipation, diarrhea, nausea, rectal pain and vomiting  Endocrine: Negative for cold intolerance, heat intolerance, polydipsia, polyphagia and polyuria  Genitourinary: Negative for decreased urine volume, difficulty urinating, dysuria, enuresis, flank pain, frequency, genital sores, hematuria and urgency  Musculoskeletal: Negative for arthralgias, back pain, gait problem, joint swelling, myalgias, neck pain and neck stiffness  Skin: Negative for color change, pallor, rash and wound  Allergic/Immunologic: Negative for environmental allergies, food allergies and immunocompromised state  Neurological: Negative for dizziness, tremors, seizures, syncope, facial asymmetry, speech difficulty, weakness, light-headedness, numbness and headaches  Hematological: Negative for adenopathy  Does not bruise/bleed easily  Psychiatric/Behavioral: Negative for agitation, self-injury, sleep disturbance and suicidal ideas  The patient is not nervous/anxious  Objective:      /72   Pulse 69   Resp 16   Ht 5' 5" (1 651 m)   Wt 84 4 kg (186 lb)   SpO2 99%   BMI 30 95 kg/m²          Physical Exam   Constitutional: She is oriented to person, place, and time  She appears well-developed and well-nourished  No distress  HENT:   Head: Normocephalic and atraumatic  Right Ear: External ear normal    Left Ear: External ear normal    Mouth/Throat: Oropharynx is clear and moist    Eyes: Conjunctivae and EOM are normal  Pupils are equal, round, and reactive to light  No scleral icterus  Neck: Normal range of motion  Neck supple   No JVD present  No tracheal deviation present  No thyromegaly present  Cardiovascular: Normal rate, regular rhythm and intact distal pulses  Exam reveals no gallop and no friction rub  No murmur heard  Pulmonary/Chest: Effort normal and breath sounds normal  No respiratory distress  She has no wheezes  She has no rales  She exhibits no tenderness  Abdominal: Soft  Bowel sounds are normal  She exhibits no distension and no mass  There is no tenderness  There is no rebound and no guarding  Musculoskeletal: Normal range of motion  She exhibits no edema, tenderness or deformity  Lymphadenopathy:     She has no cervical adenopathy  Neurological: She is alert and oriented to person, place, and time  She has normal reflexes  No cranial nerve deficit  Coordination normal    Skin: Skin is warm and dry  No rash noted  She is not diaphoretic  No erythema  No pallor  Psychiatric: She has a normal mood and affect   Her behavior is normal  Judgment and thought content normal

## 2018-09-01 LAB
VIT A SERPL-MCNC: 37.9 UG/DL (ref 33.1–100)
VIT B1 BLD-SCNC: 115.9 NMOL/L (ref 66.5–200)

## 2018-09-07 ENCOUNTER — OFFICE VISIT (OUTPATIENT)
Dept: BARIATRICS | Facility: CLINIC | Age: 58
End: 2018-09-07
Payer: COMMERCIAL

## 2018-09-07 VITALS
BODY MASS INDEX: 30.22 KG/M2 | SYSTOLIC BLOOD PRESSURE: 130 MMHG | WEIGHT: 188 LBS | HEIGHT: 66 IN | HEART RATE: 56 BPM | TEMPERATURE: 98.5 F | DIASTOLIC BLOOD PRESSURE: 86 MMHG

## 2018-09-07 DIAGNOSIS — E61.1 IRON DEFICIENCY: ICD-10-CM

## 2018-09-07 DIAGNOSIS — E55.9 VITAMIN D INSUFFICIENCY: ICD-10-CM

## 2018-09-07 DIAGNOSIS — K91.2 POSTSURGICAL MALABSORPTION: ICD-10-CM

## 2018-09-07 DIAGNOSIS — Z98.84 BARIATRIC SURGERY STATUS: Primary | ICD-10-CM

## 2018-09-07 PROCEDURE — 99214 OFFICE O/P EST MOD 30 MIN: CPT | Performed by: PHYSICIAN ASSISTANT

## 2018-09-07 NOTE — ASSESSMENT & PLAN NOTE
Malabsorption- patient is at risk for malabsorption of vitamins/minerals secondary to malabsorption from her procedure and restriction of intakes  Reviewed current supplements and advised on same    She had most but not all of her vitamin levels done by PCP as the bariatric association also recommends checking copper and zinc levels    She is taking one out of two doses of a bariatric vitamin-she is taking a different supplement than we suggest-reviewed some of contents  Since she is only taking 1/2 of the dose she is getting around 9 mg of iron and only 250 mg of calcium per day and 1000 IU vitamin d    With low iron stores and low vitamin D    Advised to take full dose as per recommendations but to also add an EXTRA 500 mg calcium citrate with D and an extra 2000 IU vitamin D3 along with at least one high potency iron daily-recommended either Bariatric fusion iron OR Vitron C 65 mg    She is reluctant to make significant vitamin/mineral changes and was argumentative about same  Advised on importance to avoid further deficiencies and associated medical problems    I will have her get iron studies rechecked in 3-5 months and also check her copper and zinc levels then

## 2018-09-07 NOTE — ASSESSMENT & PLAN NOTE
-s/p Mirna-En-Y Gastric Bypass with Dr Abhijit Bower on 7/25/2016  Overall doing Fair  She is up 13 lb from last year-a little concerned but feels happy with where she is/doesn't want to gain more    Initial: 297 4 lb  Current: 188 lb  EWL:78% which still is average weight loss  Jossue:175 lb-one year post-op  Current BMI is Body mass index is 30 34 kg/m²      Tolerating a regular diet-yes  Eating at least 60 grams of protein per day-yes  Following 30/60 minute rule with liquids-30/30-advised to go back to 30/60 to help avoid between meal snacking  Drinking at least 64 ounces of fluid per day-yes  Drinking carbonated beverages-no  Sufficient exercise-yes  Using NSAIDs regularly-no  Using nicotine-no  Using alcohol-no    She is eating out 2/3 meals per day-advised on diet/encouraged to food log to be more aware of her intakes  She notes she does not cook either, though she can do so-encouraged at least some meals at home  Offered follow-up with our staff if she wants help with diet and life-style changes    She was argumentative with both diet suggestions and vitamin/mineral supplements

## 2018-09-07 NOTE — PATIENT INSTRUCTIONS
Follow-up in one year  We kindly ask that you arrive 15 minutes before your scheduled appointment time with your provider to allow you to be roomed, have your vital signs checked and your chart updated by our staff  We thank you for your patience at your visit  Follow diet as discussed  Follow  vitamin and mineral recommendations as reviewed with you  Exercise as tolerated    If you have gotten a lab slip at this visit, please note that most labs are FASTING-but you need to drink water the night before and the morning before your labs are done  It is HIGHLY RECOMMENDED that you check with your insurance to make sure all the labs ordered are covered by your individual insurance policy  This is especially important if you also get labs done by other providers outside of [facility]  You want to avoid having duplicate labs done  Note you will be given a lab slip AFTER  your annual visit next year to check your vitamin and mineral levels  You will not need to make a second appointment after the labs are received/reviewed  You will receive a letter/and or phone call with your results  Most labs do NOT honor a lab slip dated one year in advance now  Call our office if he have any problems with abdominal pain especially if associated with fever, chills, nausea, vomiting or any other concerns  All  Post-bariatric surgery patients should be aware that very small quantities of any alcohol  can cause impairment and it is very possible not to feel the effect  The effect can be in the system for several hours  It is also a stomach irritant  It is advised to AVOID alcohol, Nonsteroidal antiinflammatory drugs (NSAIDS) and nicotine of all forms   Any of these can cause stomach irritation/pain  ADD an EXTRA 2000 IU vitamin D3 daily and one extra 500 mg calcium citrate with D    ADD a high potency iron daily-recommend Vitron C 65 mg OR Bariatric fusion iron once a day for 2 weeks   If tolerated then increase to twice a day for 2 more weeks-if that is tolerated then increase to three times per day- (stay on the highest dose you can tolerate-one to three pills per day)  Labs should be rechecked in 3- 5 months )  Add a daily stool  Softener or miralax daily to avoid constipation with iron    Take your 3 bariatric vitamins twice a day now as well  NOTE: if you don't want to try the high potency iron then at least see if you can tolerate one extra 18-27 mg iron per day and also take stool softener or miralax daily    SUMMARY; Take your 3 bariatric vitamins in the morning  And a daily stool softener or miralax  Mid -day take an EXTRA at least 18-27 mg of iron    Mid afternoon add an extra 500 mg calcium citrate with D and an EXTRA 2000 IU vitamin D3    Evening or bedtime take your other 3 bariatric vitamins      You can get your repeat labs in December 2018  or January 2019

## 2018-09-07 NOTE — PROGRESS NOTES
Assessment/Plan:    Bariatric surgery status  -s/p Mirna-En-Y Gastric Bypass with Dr Callie Chamberlain on 7/25/2016  Overall doing Fair  She is up 13 lb from last year-a little concerned but feels happy with where she is/doesn't want to gain more    Initial: 297 4 lb  Current: 188 lb  EWL:78% which still is average weight loss  Jossue:175 lb-one year post-op  Current BMI is Body mass index is 30 34 kg/m²      Tolerating a regular diet-yes  Eating at least 60 grams of protein per day-yes  Following 30/60 minute rule with liquids-30/30-advised to go back to 30/60 to help avoid between meal snacking  Drinking at least 64 ounces of fluid per day-yes  Drinking carbonated beverages-no  Sufficient exercise-yes  Using NSAIDs regularly-no  Using nicotine-no  Using alcohol-no    She is eating out 2/3 meals per day-advised on diet/encouraged to food log to be more aware of her intakes  She notes she does not cook either, though she can do so-encouraged at least some meals at home  Offered follow-up with our staff if she wants help with diet and life-style changes    She was argumentative with both diet suggestions and vitamin/mineral supplements    Postsurgical malabsorption  Malabsorption- patient is at risk for malabsorption of vitamins/minerals secondary to malabsorption from her procedure and restriction of intakes  Reviewed current supplements and advised on same    She had most but not all of her vitamin levels done by PCP as the bariatric association also recommends checking copper and zinc levels    She is taking one out of two doses of a bariatric vitamin-she is taking a different supplement than we suggest-reviewed some of contents  Since she is only taking 1/2 of the dose she is getting around 9 mg of iron and only 250 mg of calcium per day and 1000 IU vitamin d    With low iron stores and low vitamin D    Advised to take full dose as per recommendations but to also add an EXTRA 500 mg calcium citrate with D and an extra 2000 IU vitamin D3 along with at least one high potency iron daily-recommended either Bariatric fusion iron OR Vitron C 65 mg    She is reluctant to make significant vitamin/mineral changes and was argumentative about same  Advised on importance to avoid further deficiencies and associated medical problems    I will have her get iron studies rechecked in 3-5 months and also check her copper and zinc levels then  Vitamin D insufficiency  Advised she would benefit from extra 2000 IU vitamin D3 daily  Labs were ordered by her PCP    Iron deficiency  As above she is reluctant to increase iron with concern for constipation  Advised alternatives are to try daily stool softener or miralax and trial adding one high potency iron-like bariatric fusion iron OR Vitron C 65 mg-at least once/day OR I can send her to hematology  She was resisitent to both  Advised if levels become low she will also develop anemia and advised of symptoms of same  She may consider adding an extra iron-also advised she can try at least an 18-27 mg OTC iron and get labs rechecked in 3-4 months  Lab slip provided       Diagnoses and all orders for this visit:    Bariatric surgery status  -     Copper Level; Future  -     CBC and Platelet; Future  -     Ferritin; Future  -     Iron Saturation %; Future  -     Zinc; Future    Postsurgical malabsorption  -     Copper Level; Future  -     CBC and Platelet; Future  -     Ferritin; Future  -     Iron Saturation %; Future  -     Zinc; Future    Vitamin D insufficiency    Iron deficiency          Subjective:      Patient ID: Nigel Li is a 63 y o  female  She is here in routine follow-up  She has gained weight over the past year  Yamileth Villanueva with this but "doesn't want to gain more"  She is tolerating a regular diet  Eats out for most meals  She is doing some regular vitamins   Taking only 1/2 of her bariatric supplements as she "tends to forget' to take them all        The following portions of the patient's history were reviewed and updated as appropriate: allergies, current medications, past family history, past medical history, past social history, past surgical history and problem list     Review of Systems   Constitutional: Negative for chills, fever and unexpected weight change (weight gain from last year)  Respiratory: Negative for shortness of breath and wheezing  Cardiovascular: Negative for chest pain and palpitations  Gastrointestinal: Negative for abdominal pain, constipation, diarrhea, nausea and vomiting  Psychiatric/Behavioral: Suicidal ideas: no complait of anxiety or depression  Objective:      /86 (BP Location: Left arm, Patient Position: Sitting, Cuff Size: Adult)   Pulse 56   Temp 98 5 °F (36 9 °C) (Tympanic)   Ht 5' 6" (1 676 m)   Wt 85 3 kg (188 lb)   BMI 30 34 kg/m²          Physical Exam   Constitutional: She is oriented to person, place, and time  She appears well-developed and well-nourished  HENT:   Mouth/Throat: Oropharynx is clear and moist    Eyes: Conjunctivae are normal  No scleral icterus  Cardiovascular: Normal rate, regular rhythm and normal heart sounds  Pulmonary/Chest: Effort normal and breath sounds normal    Abdominal: Soft  There is no tenderness  No incisional hernias appreciated   Musculoskeletal:   Normal gait   Neurological: She is alert and oriented to person, place, and time  Psychiatric: She has a normal mood and affect  Her behavior is normal  Judgment and thought content normal    Nursing note and vitals reviewed  GOALS: Continued weight loss with good nutrition intakes    Normal vitamin and mineral levels  Exercise as tolerated    BARRIERS: none identified

## 2018-09-07 NOTE — ASSESSMENT & PLAN NOTE
As above she is reluctant to increase iron with concern for constipation  Advised alternatives are to try daily stool softener or miralax and trial adding one high potency iron-like bariatric fusion iron OR Vitron C 65 mg-at least once/day OR I can send her to hematology  She was resisitent to both  Advised if levels become low she will also develop anemia and advised of symptoms of same  She may consider adding an extra iron-also advised she can try at least an 18-27 mg OTC iron and get labs rechecked in 3-4 months   Lab slip provided

## 2019-03-12 ENCOUNTER — TELEPHONE (OUTPATIENT)
Dept: INTERNAL MEDICINE CLINIC | Facility: CLINIC | Age: 59
End: 2019-03-12

## 2019-04-05 ENCOUNTER — OFFICE VISIT (OUTPATIENT)
Dept: INTERNAL MEDICINE CLINIC | Facility: CLINIC | Age: 59
End: 2019-04-05
Payer: COMMERCIAL

## 2019-04-05 VITALS
DIASTOLIC BLOOD PRESSURE: 82 MMHG | OXYGEN SATURATION: 100 % | HEART RATE: 60 BPM | BODY MASS INDEX: 30.82 KG/M2 | HEIGHT: 66 IN | SYSTOLIC BLOOD PRESSURE: 116 MMHG | WEIGHT: 191.8 LBS

## 2019-04-05 DIAGNOSIS — Z98.84 BARIATRIC SURGERY STATUS: ICD-10-CM

## 2019-04-05 DIAGNOSIS — E66.9 OBESITY (BMI 30-39.9): ICD-10-CM

## 2019-04-05 DIAGNOSIS — E61.1 IRON DEFICIENCY: Primary | ICD-10-CM

## 2019-04-05 DIAGNOSIS — E55.9 VITAMIN D INSUFFICIENCY: ICD-10-CM

## 2019-04-05 PROCEDURE — 99214 OFFICE O/P EST MOD 30 MIN: CPT | Performed by: INTERNAL MEDICINE

## 2019-04-05 PROCEDURE — 3008F BODY MASS INDEX DOCD: CPT | Performed by: INTERNAL MEDICINE

## 2019-04-05 PROCEDURE — 1036F TOBACCO NON-USER: CPT | Performed by: INTERNAL MEDICINE

## 2019-09-06 ENCOUNTER — OFFICE VISIT (OUTPATIENT)
Dept: BARIATRICS | Facility: CLINIC | Age: 59
End: 2019-09-06
Payer: COMMERCIAL

## 2019-09-06 VITALS
HEIGHT: 66 IN | TEMPERATURE: 98.3 F | DIASTOLIC BLOOD PRESSURE: 78 MMHG | WEIGHT: 189 LBS | BODY MASS INDEX: 30.37 KG/M2 | HEART RATE: 90 BPM | SYSTOLIC BLOOD PRESSURE: 120 MMHG

## 2019-09-06 DIAGNOSIS — Z98.84 BARIATRIC SURGERY STATUS: Primary | ICD-10-CM

## 2019-09-06 DIAGNOSIS — E55.9 VITAMIN D INSUFFICIENCY: ICD-10-CM

## 2019-09-06 DIAGNOSIS — K91.2 POSTSURGICAL MALABSORPTION: ICD-10-CM

## 2019-09-06 DIAGNOSIS — E66.9 OBESITY, CLASS I, BMI 30-34.9: ICD-10-CM

## 2019-09-06 DIAGNOSIS — E61.1 IRON DEFICIENCY: ICD-10-CM

## 2019-09-06 PROBLEM — E66.811 OBESITY, CLASS I, BMI 30-34.9: Status: ACTIVE | Noted: 2019-09-06

## 2019-09-06 PROCEDURE — 99214 OFFICE O/P EST MOD 30 MIN: CPT | Performed by: PHYSICIAN ASSISTANT

## 2019-09-06 NOTE — ASSESSMENT & PLAN NOTE
Malabsorption- patient is at risk for malabsorption of vitamins/minerals secondary to malabsorption from her procedure and restriction of intakes  Reviewed current supplements and advised on same  She is taking 1/2 of a bariatric mvi  (3 out of 6 bariatric mvi -which does contain some dairy-around 375 mg in the 3 capsules  And 30 gm of an OTC iron  And taking an extra vitamin D  She is due for repeat routine labs now-will order same

## 2019-09-06 NOTE — ASSESSMENT & PLAN NOTE
She had iron deficiency last year-added an OTC extra 30 mg of iron  Her current mvi -since she is taking 1/2 dose gives her around 21 gm of iron   She has not been able to increase oral iron because of constipation-advised if level remains low we can consider referral to hematology if she is not improved

## 2019-09-06 NOTE — ASSESSMENT & PLAN NOTE
Stable from last year   She notes she is looking forward to adding back regular exercise once she gets settled in her new home in the near future

## 2019-09-06 NOTE — ASSESSMENT & PLAN NOTE
-s/p Mirna-En-Y Gastric Bypass with Dr Sharda Donohue on 7/5/2016  Overall doing Well -maintaining her weight loss    She notes she will be moving closer to [place] and I advised her to follow-up with a [facility] and advised her on looking on the [employer] website to locate one  Advised her she can let us know where she wants her records sent when she gets established at her new home  Initial:297 4 lb  Current:189 lb  EWL:77% which remains very good weight loss  Jossue: 175 lb-one year post-op  Current BMI is Body mass index is 30 51 kg/m²      Tolerating a regular diet-yes  Eating at least 60 grams of protein per day-yes  Following 30/60 minute rule with liquids-generally well  Drinking at least 64 ounces of fluid per day-yes  Drinking carbonated beverages-no  Sufficient exercise-she is physically active but no formal exercise as she is in transition in her residence  Using NSAIDs regularly-no  Using nicotine-no  Using alcohol-no    Colonsocopy/colon cancer screening is up-to-date as reported by patient

## 2019-09-06 NOTE — PATIENT INSTRUCTIONS
Follow-up in one year at [facility]  We thank you for your patience at your visit  You want to look for a [facility] center of excellence -you can [facility] on [facility]- and click on patient-find provider to find a local center-then contact us when you want your recordds sent     Follow diet as discussed  Follow  vitamin and mineral recommendations as reviewed with you  Bariatric vitamins are highly recommended  Vitamins are important for a life-time  Low levels can lead to deficiency which can lead to other medical problems  Exercise as tolerated    If you have gotten a lab slip at this visit, please note that most labs are FASTING-but you need to drink water the night before and the morning before your labs are done  It is HIGHLY RECOMMENDED that you check with your insurance to make sure all the labs ordered are covered by your individual insurance policy  This is especially important if you also get labs done by other providers outside of [facility]  You want to avoid having duplicate labs done  Note you will be given a lab slip AFTER  your annual visit next year to check your vitamin and mineral levels  You will not need to make a second appointment after the labs are received/reviewed  You will receive a letter/and or phone call with your results  Most labs do NOT honor a lab slip dated one year in advance now  Call our office if he have any problems with abdominal pain especially if associated with fever, chills, nausea, vomiting or any other concerns  All  Post-bariatric surgery patients should be aware that very small quantities of any alcohol  can cause impairment and it is very possible not to feel the effect  The effect can be in the system for several hours  It is also a stomach irritant  It is advised to AVOID alcohol, Nonsteroidal antiinflammatory drugs (NSAIDS) and nicotine of all forms    Any of these can cause stomach irritation/pain

## 2019-09-06 NOTE — PROGRESS NOTES
Assessment/Plan:    Bariatric surgery status  -s/p Mirna-En-Y Gastric Bypass with Dr Sheldon Beltran on 7/5/2016  Overall doing Well -maintaining her weight loss    She notes she will be moving closer to [place] and I advised her to follow-up with a bariatric center of excellence and advised her on looking on the [employer] website to locate one  Advised her she can let us know where she wants her records sent when she gets established at her new home  Initial:297 4 lb  Current:189 lb  EWL:77% which remains very good weight loss  Jossue: 175 lb-one year post-op  Current BMI is Body mass index is 30 51 kg/m²  Tolerating a regular diet-yes  Eating at least 60 grams of protein per day-yes  Following 30/60 minute rule with liquids-generally well  Drinking at least 64 ounces of fluid per day-yes  Drinking carbonated beverages-no  Sufficient exercise-she is physically active but no formal exercise as she is in transition in her residence  Using NSAIDs regularly-no  Using nicotine-no  Using alcohol-no    Colonsocopy/colon cancer screening is up-to-date as reported by patient      Postsurgical malabsorption  Malabsorption- patient is at risk for malabsorption of vitamins/minerals secondary to malabsorption from her procedure and restriction of intakes  Reviewed current supplements and advised on same  She is taking 1/2 of a bariatric mvi  (3 out of 6 bariatric mvi -which does contain some dairy-around 375 mg in the 3 capsules  And 30 gm of an OTC iron  And taking an extra vitamin D  She is due for repeat routine labs now-will order same      Iron deficiency  She had iron deficiency last year-added an OTC extra 30 mg of iron  Her current mvi -since she is taking 1/2 dose gives her around 21 gm of iron  She has not been able to increase oral iron because of constipation-advised if level remains low we can consider referral to hematology if she is not improved    Obesity, Class I, BMI 30-34 9  Stable from last year   She notes she is looking forward to adding back regular exercise once she gets settled in her new home in the near future       Diagnoses and all orders for this visit:    Bariatric surgery status  -     CBC and Platelet; Future  -     Comprehensive metabolic panel; Future  -     Copper Level; Future  -     Ferritin; Future  -     Folate; Future  -     Iron Saturation %; Future  -     Lipid panel; Future  -     PTH, intact; Future  -     Vitamin A; Future  -     Vitamin B1, whole blood; Future  -     Vitamin B12; Future  -     Vitamin D 25 hydroxy; Future  -     Zinc; Future    Postsurgical malabsorption  -     CBC and Platelet; Future  -     Comprehensive metabolic panel; Future  -     Copper Level; Future  -     Ferritin; Future  -     Folate; Future  -     Iron Saturation %; Future  -     Lipid panel; Future  -     PTH, intact; Future  -     Vitamin A; Future  -     Vitamin B1, whole blood; Future  -     Vitamin B12; Future  -     Vitamin D 25 hydroxy; Future  -     Zinc; Future    Obesity, Class I, BMI 30-34 9  -     CBC and Platelet; Future  -     Comprehensive metabolic panel; Future  -     Copper Level; Future  -     Ferritin; Future  -     Folate; Future  -     Iron Saturation %; Future  -     Lipid panel; Future  -     PTH, intact; Future  -     Vitamin A; Future  -     Vitamin B1, whole blood; Future  -     Vitamin B12; Future  -     Vitamin D 25 hydroxy; Future  -     Zinc; Future    Vitamin D insufficiency    Iron deficiency          Subjective:      Patient ID: Keila Ramírez is a 63 y o  female  She is here in routine follow-up  She is tolerating a regular diet  She notes she will be moving toward [place] and is between homes now so is not actively exercising  She is taking partial bariatric vitamins  She has maintained her weight and feels well   She has no complaints today      The following portions of the patient's history were reviewed and updated as appropriate: allergies, current medications, past family history, past medical history, past social history, past surgical history and problem list     Review of Systems   Constitutional: Negative for chills and fever  Unexpected weight change: planned weight loss  Respiratory: Negative for shortness of breath and wheezing  Cardiovascular: Negative for chest pain and palpitations  Gastrointestinal: Negative for abdominal pain, constipation, diarrhea, nausea and vomiting  Psychiatric/Behavioral: Suicidal ideas: no complait of anxiety or depression  Objective:      /78   Pulse 90   Temp 98 3 °F (36 8 °C) (Tympanic)   Ht 5' 6" (1 676 m)   Wt 85 7 kg (189 lb)   LMP 07/25/2016   BMI 30 51 kg/m²          Physical Exam   Constitutional: She is oriented to person, place, and time  She appears well-developed and well-nourished  HENT:   Mouth/Throat: Oropharynx is clear and moist    Eyes: Conjunctivae are normal  No scleral icterus  Cardiovascular: Normal rate and regular rhythm  Pulmonary/Chest: Effort normal and breath sounds normal    Abdominal: Soft  There is no tenderness  No incisional hernias appreciated   Musculoskeletal:   Normal gait   Neurological: She is alert and oriented to person, place, and time  Psychiatric: She has a normal mood and affect  Her behavior is normal  Judgment and thought content normal    Nursing note and vitals reviewed  GOALS: Continued weight loss with good nutrition intakes    Normal vitamin and mineral levels  Exercise as tolerated    BARRIERS: none identified

## 2019-09-13 ENCOUNTER — APPOINTMENT (OUTPATIENT)
Dept: LAB | Facility: CLINIC | Age: 59
End: 2019-09-13
Payer: COMMERCIAL

## 2019-09-13 DIAGNOSIS — E66.9 OBESITY, CLASS I, BMI 30-34.9: ICD-10-CM

## 2019-09-13 DIAGNOSIS — Z98.84 BARIATRIC SURGERY STATUS: ICD-10-CM

## 2019-09-13 DIAGNOSIS — K91.2 POSTSURGICAL MALABSORPTION: ICD-10-CM

## 2019-09-13 LAB
25(OH)D3 SERPL-MCNC: 21.8 NG/ML (ref 30–100)
ALBUMIN SERPL BCP-MCNC: 3.6 G/DL (ref 3.5–5)
ALP SERPL-CCNC: 74 U/L (ref 46–116)
ALT SERPL W P-5'-P-CCNC: 23 U/L (ref 12–78)
ANION GAP SERPL CALCULATED.3IONS-SCNC: 3 MMOL/L (ref 4–13)
AST SERPL W P-5'-P-CCNC: 16 U/L (ref 5–45)
BILIRUB SERPL-MCNC: 0.35 MG/DL (ref 0.2–1)
BUN SERPL-MCNC: 16 MG/DL (ref 5–25)
CALCIUM SERPL-MCNC: 9 MG/DL (ref 8.3–10.1)
CHLORIDE SERPL-SCNC: 105 MMOL/L (ref 100–108)
CHOLEST SERPL-MCNC: 170 MG/DL (ref 50–200)
CO2 SERPL-SCNC: 29 MMOL/L (ref 21–32)
CREAT SERPL-MCNC: 0.63 MG/DL (ref 0.6–1.3)
ERYTHROCYTE [DISTWIDTH] IN BLOOD BY AUTOMATED COUNT: 14.3 % (ref 11.6–15.1)
FERRITIN SERPL-MCNC: 6 NG/ML (ref 8–388)
FOLATE SERPL-MCNC: 12.4 NG/ML (ref 3.1–17.5)
GFR SERPL CREATININE-BSD FRML MDRD: 99 ML/MIN/1.73SQ M
GLUCOSE P FAST SERPL-MCNC: 89 MG/DL (ref 65–99)
HCT VFR BLD AUTO: 38 % (ref 34.8–46.1)
HDLC SERPL-MCNC: 63 MG/DL (ref 40–60)
HGB BLD-MCNC: 11.6 G/DL (ref 11.5–15.4)
IRON SATN MFR SERPL: 6 %
IRON SERPL-MCNC: 27 UG/DL (ref 50–170)
LDLC SERPL CALC-MCNC: 92 MG/DL (ref 0–100)
MCH RBC QN AUTO: 27.4 PG (ref 26.8–34.3)
MCHC RBC AUTO-ENTMCNC: 30.5 G/DL (ref 31.4–37.4)
MCV RBC AUTO: 90 FL (ref 82–98)
NONHDLC SERPL-MCNC: 107 MG/DL
PLATELET # BLD AUTO: 256 THOUSANDS/UL (ref 149–390)
PMV BLD AUTO: 9.5 FL (ref 8.9–12.7)
POTASSIUM SERPL-SCNC: 4.3 MMOL/L (ref 3.5–5.3)
PROT SERPL-MCNC: 7.4 G/DL (ref 6.4–8.2)
PTH-INTACT SERPL-MCNC: 53.5 PG/ML (ref 18.4–80.1)
RBC # BLD AUTO: 4.24 MILLION/UL (ref 3.81–5.12)
SODIUM SERPL-SCNC: 137 MMOL/L (ref 136–145)
TIBC SERPL-MCNC: 424 UG/DL (ref 250–450)
TRIGL SERPL-MCNC: 75 MG/DL
VIT B12 SERPL-MCNC: 390 PG/ML (ref 100–900)
WBC # BLD AUTO: 4.21 THOUSAND/UL (ref 4.31–10.16)

## 2019-09-13 PROCEDURE — 84630 ASSAY OF ZINC: CPT

## 2019-09-13 PROCEDURE — 83540 ASSAY OF IRON: CPT

## 2019-09-13 PROCEDURE — 82306 VITAMIN D 25 HYDROXY: CPT

## 2019-09-13 PROCEDURE — 84425 ASSAY OF VITAMIN B-1: CPT

## 2019-09-13 PROCEDURE — 83550 IRON BINDING TEST: CPT

## 2019-09-13 PROCEDURE — 85027 COMPLETE CBC AUTOMATED: CPT

## 2019-09-13 PROCEDURE — 82607 VITAMIN B-12: CPT

## 2019-09-13 PROCEDURE — 82728 ASSAY OF FERRITIN: CPT

## 2019-09-13 PROCEDURE — 80061 LIPID PANEL: CPT

## 2019-09-13 PROCEDURE — 82525 ASSAY OF COPPER: CPT

## 2019-09-13 PROCEDURE — 80053 COMPREHEN METABOLIC PANEL: CPT

## 2019-09-13 PROCEDURE — 82746 ASSAY OF FOLIC ACID SERUM: CPT

## 2019-09-13 PROCEDURE — 84590 ASSAY OF VITAMIN A: CPT

## 2019-09-13 PROCEDURE — 83970 ASSAY OF PARATHORMONE: CPT

## 2019-09-13 PROCEDURE — 36415 COLL VENOUS BLD VENIPUNCTURE: CPT

## 2019-09-17 LAB
COPPER SERPL-MCNC: 118 UG/DL (ref 72–166)
VIT B1 BLD-SCNC: 110.6 NMOL/L (ref 66.5–200)
ZINC SERPL-MCNC: 88 UG/DL (ref 56–134)

## 2019-09-20 LAB — VIT A SERPL-MCNC: 23.3 UG/DL (ref 20.1–62)

## 2020-04-10 ENCOUNTER — TELEPHONE (OUTPATIENT)
Dept: INTERNAL MEDICINE CLINIC | Facility: CLINIC | Age: 60
End: 2020-04-10

## 2020-12-09 ENCOUNTER — TELEPHONE (OUTPATIENT)
Dept: BARIATRICS | Facility: CLINIC | Age: 60
End: 2020-12-09

## 2021-01-12 ENCOUNTER — OFFICE VISIT (OUTPATIENT)
Dept: BARIATRICS | Facility: CLINIC | Age: 61
End: 2021-01-12
Payer: COMMERCIAL

## 2021-01-12 VITALS — SYSTOLIC BLOOD PRESSURE: 138 MMHG | HEART RATE: 80 BPM | DIASTOLIC BLOOD PRESSURE: 80 MMHG | TEMPERATURE: 97.1 F

## 2021-01-12 DIAGNOSIS — E66.9 OBESITY, CLASS I, BMI 30-34.9: Primary | ICD-10-CM

## 2021-01-12 DIAGNOSIS — K91.2 POSTSURGICAL MALABSORPTION: ICD-10-CM

## 2021-01-12 DIAGNOSIS — E55.9 VITAMIN D INSUFFICIENCY: ICD-10-CM

## 2021-01-12 DIAGNOSIS — Z98.84 BARIATRIC SURGERY STATUS: ICD-10-CM

## 2021-01-12 DIAGNOSIS — R12 HEART BURN: ICD-10-CM

## 2021-01-12 DIAGNOSIS — Z48.815 ENCOUNTER FOR SURGICAL AFTERCARE FOLLOWING SURGERY OF DIGESTIVE SYSTEM: ICD-10-CM

## 2021-01-12 DIAGNOSIS — E61.1 IRON DEFICIENCY: ICD-10-CM

## 2021-01-12 PROCEDURE — 99214 OFFICE O/P EST MOD 30 MIN: CPT | Performed by: PHYSICIAN ASSISTANT

## 2021-01-12 NOTE — ASSESSMENT & PLAN NOTE
She notes intermittent heart burn which is controlled with use of calcium carbonate  I recommended a short course of ppi-or h2 blocker but she declined at present  She is not interested in further evaluation via EGD at this time either   She can continue same for now as long as symptoms are controlled

## 2021-01-12 NOTE — ASSESSMENT & PLAN NOTE
Malabsorption- patient is at risk for malabsorption of vitamins/minerals secondary to malabsorption from her procedure and restriction of intakes  Reviewed current supplements and advised on same    She is taking a 20 mg of iron and 500 mg vitamin C and 14 mg zinc and 200 mg calcium carbonate  Advised on calcium based on protocol-her bariatric vitamin also does not contain much vitamin D-so would advise to add 4000 IU vitamin D3  She is overdue for routine labs-will order now

## 2021-01-12 NOTE — ASSESSMENT & PLAN NOTE
-s/p Mirna-En-Y Gastric Bypass with Dr Gilford Rota on 7/25/2016  Overall doing Fair  Initial:297 4 lb  Current:201 5 lb  EWL:69%  Jossue:175 lb  Current BMI is There is no height or weight on file to calculate BMI      Tolerating a regular diet-yes  Eating at least 60 grams of protein per day-yes  Following 30/60 minute rule with liquids-yes  Drinking at least 64 ounces of fluid per day-yes  Drinking carbonated beverages-no  Sufficient exercise-yes-recently started back at the gym -encouraged regular exercise for long-term success  Using NSAIDs regularly-no  Using nicotine-no  Using alcohol-no    Colonsocopy/colon cancer screening is up-to-date as reported by patient

## 2021-01-12 NOTE — PATIENT INSTRUCTIONS
You can switch your multivitamin to one [employer] multivitamin with 45 mg of iron instead -continue calcium recommendations-and then  You would need only 2000 IU vitamin D3 -and continue extra iron    If you stay on current vitamin would add 4000 IU vitamin D3 daily    It was good to see you again  Follow-up with RD/ for now to help with your weight loss goals  If your heart burn is controlled on your current calcium I would just continue this-if this becomes worse-let us know/make a follow-up visit if needed  For heart burn control  Avoid more than 2 cups of caffeine per day-regular coffee/tea  If you are drinking more than this gradually decrease the amount until it is less than 2 cups per day  Raise the head of the bed up especially if you have symptoms at night  You can do this by using a wedge pillow at the top of the bed OR using bed posts at the head of the bed only-keeping your body at a slant-so that your head is above your stomach all night long  Avoid eating and drinking within 2 hours of laying flat  Avoid chocolate, highly spicy foods, chili powder, pepper, peppers, high citrus foods-like tomato/tomato paste, high onion, high garlic intakes  Follow-up in one year with me  We kindly ask that you arrive 15 minutes before your scheduled appointment time with your provider to allow you to be roomed, have your vital signs checked and your chart updated by our staff  We thank you for your patience at your visit  Your appointment time is reserved only for you  If you are unable to make your scheduled visit, we would request that you call to cancel and reschedule it at your earliest convenience  Follow diet as discussed  Follow  vitamin and mineral recommendations as reviewed with you  Bariatric vitamins are highly recommended  Vitamins are important for a life-time  Low levels can lead to deficiency which can lead to other medical problems      Exercise as tolerated    If you have gotten a lab slip at this visit, please note that most labs are FASTING-but you need to drink water the night before and the morning before your labs are done  It is HIGHLY RECOMMENDED that you check with your insurance to make sure all the labs ordered are covered by your individual insurance policy  This is especially important if you also get labs done by other providers outside of [facility]  You want to avoid having duplicate labs done  Note you will be given a lab slip AFTER  your annual visit next year to check your vitamin and mineral levels  You will not need to make a second appointment after the labs are received/reviewed  You will receive a letter/and or phone call with your results  Most labs do NOT honor a lab slip dated one year in advance now  IMPORTANT if you have a [email or web address]" account, you will receive a letter of your vitamin/mineral results through the computer  Please watch for an update to your chart-since recommendations for supplement adjustments will be sent to you this way  Call our office if he have any problems with abdominal pain especially if associated with fever, chills, nausea, vomiting or any other concerns  All  Post-bariatric surgery patients should be aware that very small quantities of any alcohol  can cause impairment and it is very possible not to feel the effect  The effect can be in the system for several hours  It is also a stomach irritant  It is advised to AVOID alcohol, Nonsteroidal antiinflammatory drugs (NSAIDS) and nicotine of all forms   Any of these can cause stomach irritation/pain

## 2021-01-12 NOTE — ASSESSMENT & PLAN NOTE
By older labs-she has not been getting adequate vitamin d as her "bariatric mvi" is actually quite low in vitamin d-advised on supplements   Will await repeat labs for further advice

## 2021-01-12 NOTE — PROGRESS NOTES
Assessment/Plan:    Obesity, Class I, BMI 30-34 9  Worsened from bmi of 30 5 at her September 2019 visit to current bmi of 32 5     She notes she would like help with weight loss-not interested in adding medication for this-  I offered her follow-up with RD/ now and she is agreeable to the plan    Encounter for surgical aftercare following surgery of digestive system  -s/p Mirna-En-Y Gastric Bypass with Dr Violet Aden on 7/25/2016  Overall doing Fair  Initial:297 4 lb  Current:201 5 lb  EWL:69%  Jossue:175 lb  Current BMI is There is no height or weight on file to calculate BMI  Tolerating a regular diet-yes  Eating at least 60 grams of protein per day-yes  Following 30/60 minute rule with liquids-yes  Drinking at least 64 ounces of fluid per day-yes  Drinking carbonated beverages-no  Sufficient exercise-yes-recently started back at the gym -encouraged regular exercise for long-term success  Using NSAIDs regularly-no  Using nicotine-no  Using alcohol-no    Colonsocopy/colon cancer screening is up-to-date as reported by patient      Postsurgical malabsorption  Malabsorption- patient is at risk for malabsorption of vitamins/minerals secondary to malabsorption from her procedure and restriction of intakes  Reviewed current supplements and advised on same    She is taking a 20 mg of iron and 500 mg vitamin C and 14 mg zinc and 200 mg calcium carbonate  Advised on calcium based on protocol-her bariatric vitamin also does not contain much vitamin D-so would advise to add 4000 IU vitamin D3  She is overdue for routine labs-will order now    Iron deficiency  She had iron deficiency by older labs-she has added a 20 mg iron daily-likely will be inadequate to meet her needs/will check iron studies now to reassess    I had discussed referral to hematology in the past but she was reluctant to do so      Encouraged her to trial Peerbyi with 45 mg of iron and continue extra 20 mg of iron as tolerated  Will await labs for further advice    Vitamin D insufficiency  By older labs-she has not been getting adequate vitamin d as her "bariatric mvi" is actually quite low in vitamin d-advised on supplements  Will await repeat labs for further advice    Heart burn  She notes intermittent heart burn which is controlled with use of calcium carbonate  I recommended a short course of ppi-or h2 blocker but she declined at present  She is not interested in further evaluation via EGD at this time either  She can continue same for now as long as symptoms are controlled       Diagnoses and all orders for this visit:    Obesity, Class I, BMI 30-34 9  -     CBC and Platelet; Future  -     Comprehensive metabolic panel; Future  -     Ferritin; Future  -     Folate; Future  -     Iron Saturation %; Future  -     PTH, intact; Future  -     Vitamin A; Future  -     Vitamin B1, whole blood; Future  -     Vitamin B12; Future  -     Vitamin D 25 hydroxy; Future  -     Zinc; Future    Encounter for surgical aftercare following surgery of digestive system  -     CBC and Platelet; Future  -     Comprehensive metabolic panel; Future  -     Ferritin; Future  -     Folate; Future  -     Iron Saturation %; Future  -     PTH, intact; Future  -     Vitamin A; Future  -     Vitamin B1, whole blood; Future  -     Vitamin B12; Future  -     Vitamin D 25 hydroxy; Future  -     Zinc; Future    Postsurgical malabsorption  -     CBC and Platelet; Future  -     Comprehensive metabolic panel; Future  -     Ferritin; Future  -     Folate; Future  -     Iron Saturation %; Future  -     PTH, intact; Future  -     Vitamin A; Future  -     Vitamin B1, whole blood; Future  -     Vitamin B12; Future  -     Vitamin D 25 hydroxy; Future  -     Zinc; Future    Vitamin D insufficiency  -     CBC and Platelet; Future  -     Comprehensive metabolic panel; Future  -     Ferritin; Future  -     Folate; Future  -     Iron Saturation %; Future  -     PTH, intact;  Future  - Vitamin A; Future  -     Vitamin B1, whole blood; Future  -     Vitamin B12; Future  -     Vitamin D 25 hydroxy; Future  -     Zinc; Future    Iron deficiency  -     CBC and Platelet; Future  -     Comprehensive metabolic panel; Future  -     Ferritin; Future  -     Folate; Future  -     Iron Saturation %; Future  -     PTH, intact; Future  -     Vitamin A; Future  -     Vitamin B1, whole blood; Future  -     Vitamin B12; Future  -     Vitamin D 25 hydroxy; Future  -     Zinc; Future    Bariatric surgery status  -     CBC and Platelet; Future  -     Comprehensive metabolic panel; Future  -     Ferritin; Future  -     Folate; Future  -     Iron Saturation %; Future  -     PTH, intact; Future  -     Vitamin A; Future  -     Vitamin B1, whole blood; Future  -     Vitamin B12; Future  -     Vitamin D 25 hydroxy; Future  -     Zinc; Future    Heart burn          Subjective:      Patient ID: Monty Wei is a 62 y o  female  She has been lost to follow-up in our office since 2019 office visit as she has moved several times  She is currently living in [place], [place] and wants to continue to follow-up with us for now  She was unable to establish with another bariatric practice since her last visit with us so has not had routine labs done for a year and a half  She is taking a bariatric supplement  She takes a low dose calcium carbonate daily to control occasional heart burn which works for her  She has no desire to have further evaluation of her heart burn  She was not able to get to the gym during the Covid 19 quarantine but is happy now that her gym is open and is getting back into regular exercise  She is up a net 11 1/2 lb from the fall of 2019  She wants help with weight loss but is not interested in adding any medications to her regimen  She brought her bottles of vitamin supplements to her visit today        The following portions of the patient's history were reviewed and updated as appropriate: allergies, current medications, past family history, past medical history, past social history, past surgical history and problem list     Review of Systems   Constitutional: Positive for unexpected weight change (up a net 11 1/2 lb in 1 1/2 years)  Negative for chills and fever  Respiratory: Negative for shortness of breath and wheezing  Cardiovascular: Negative for chest pain and palpitations  Gastrointestinal: Negative for abdominal pain, constipation, diarrhea, nausea and vomiting  Occasional heart burn -controlled on calcium carbonate    Psychiatric/Behavioral: Negative for suicidal ideas (no complait of anxiety or depression)  Objective:      /80 (BP Location: Left arm, Patient Position: Sitting, Cuff Size: Standard)   Pulse 80   Temp (!) 97 1 °F (36 2 °C) (Tympanic)   LMP 07/25/2016          Physical Exam  Vitals signs and nursing note reviewed  Constitutional:       Appearance: She is obese  Pulmonary:      Effort: Pulmonary effort is normal    Abdominal:      Comments: Obese abdomen; signs of excess abdominal skin; incision sites appear faded/well-healed   Neurological:      Mental Status: She is alert and oriented to person, place, and time     Psychiatric:      Comments: Mood: appropriate to situation

## 2021-01-12 NOTE — ASSESSMENT & PLAN NOTE
Worsened from bmi of 30 5 at her September 2019 visit to current bmi of 32 5     She notes she would like help with weight loss-not interested in adding medication for this-  I offered her follow-up with RD/ now and she is agreeable to the plan

## 2021-01-12 NOTE — ASSESSMENT & PLAN NOTE
She had iron deficiency by older labs-she has added a 20 mg iron daily-likely will be inadequate to meet her needs/will check iron studies now to reassess    I had discussed referral to hematology in the past but she was reluctant to do so      Encouraged her to trial [employer] [facility] with 45 mg of iron and continue extra 20 mg of iron as tolerated  Will await labs for further advice

## 2021-01-27 NOTE — PROGRESS NOTES
Bariatric Follow Up Nutrition Note    Type of surgery  Gastric bypass: laparoscopic  Surgery Date: 2016  4 1/2 years  post-op  Surgeon: Dr Mary Powell  62 y o   female  LMP 2016      Ht 5' 6" (1 676 m)   Wt 90 3 kg (199 lb)   LMP 2016   BMI 32 12 kg/m²      2021 201 5#  Wt Readings from Last 3 Encounters:   19 85 7 kg (189 lb)   19 87 kg (191 lb 12 8 oz)   18 85 3 kg (188 lb)       [place]  [facility]asor Equation:  1506 kcal   Estimated calories for weight loss 807-1307 kcal ( 1-2# per wk wt loss - sedentary )  Estimated protein needs 72-86 grams 1 0-1 2 gms/kg IBW )   Estimated fluid needs 2146 ml (30 ml/kg IBW )  72 ounces     Weight on Day of Weight Loss Surgery: 286#  Weight in (lb) to have BMI = 25: 157 4#  Jossue wt = 175#  Post-Op Wt Loss: 98 3#/ 70% EBWL in 4 1/2 year(s)  Patient's personal goal is to lose 10-15 pounds so that her clothes fit better     Review of History and Medications   Past Medical History:   Diagnosis Date    Arthritis     CPAP (continuous positive airway pressure) dependence     Hiatal hernia     Hypertension     Morbid obesity ([address] )     [place] 8/10/16  R   16     Obesity     Obstructive [place] apnea     [place]  R    17     Pain aggravated by activities of daily living     neck,knees,back    Postgastrectomy malabsorption     Sleep apnea     uses c-pap    Wears glasses      Past Surgical History:   Procedure Laterality Date     SECTION      DILATION AND CURETTAGE OF UTERUS      ESOPHAGOGASTRODUODENOSCOPY N/A 2016    Procedure: ESOPHAGOGASTRODUODENOSCOPY (EGD); Surgeon: Glenn Perez MD;  Location: [facility];   Service:     UT LAP GASTRIC BYPASS/TRISTIAN-EN-Y N/A 2016    Procedure: BYPASS GASTRIC  TRISTIAN-EN-Y LAPAROSCOPIC, WITH INTRA OP EGD;  Surgeon: Glenn Perez MD;  Location: [facility];  Service: Bariatrics     Social History     Socioeconomic History    Marital status:       Spouse name: Not on file    Number of children: Not on file    Years of education: Not on file    Highest education level: Not on file   Occupational History    Occupation:      Social Needs    Financial resource strain: Not on file    Food insecurity     Worry: Not on file     Inability: Not on file   [employer] needs     Medical: Not on file     Non-medical: Not on file   Tobacco Use    Smoking status: Former Smoker     Quit date:      Years since quittin 0    Smokeless tobacco: Never Used   Substance and Sexual Activity    Alcohol use: No    Drug use: No    Sexual activity: Yes     Partners: Male   Lifestyle    Physical activity     Days per week: Not on file     Minutes per session: Not on file    Stress: Not on file   Relationships    Social connections     Talks on phone: Not on file     Gets together: Not on file     Attends [employer] service: Not on file     Active member of club or organization: Not on file     Attends meetings of clubs or organizations: Not on file     Relationship status: Not on file    Intimate partner violence     Fear of current or ex partner: Not on file     Emotionally abused: Not on file     Physically abused: Not on file     Forced sexual activity: Not on file   Other Topics Concern    Not on file   Social History Narrative    Lives with spouse     per allscript        Current Outpatient Medications:     Multiple Vitamins-Minerals (BARIATRIC FUSION PO), Take by mouth, Disp: , Rfl:     Food Intake and Lifestyle Assessment   Food Intake Assessment completed via usual diet recall  Breakfast: Protein coffee - coffee with protein ( 20 to 30 grams ) protein   Snack: 0   Lunch: left overs - Chicken and veggies - skipping 2-3 times per week   Was grazing more due to skipping   Snack: 0  Dinner: lean protein and veggies   Snack: chocolate covered 1/2 banana   For Haley did bake, but no usually Beverage intake: water, coffee/tea and decaf tea  Diet texture/stage: regular  Protein supplement: daily one for breakfast   Estimated protein intake per day: 60 grams   Estimated fluid intake per day: 48-64 ounces but sometimes struggles   Meals eaten away from home: rarely   Typical meal pattern: 3 meals per day and 1-2 snacks per day  Eating Behaviors: Appropriate diet advancement, Appropriate portion sizes, Does not drink with meals and waits 30-minutes after meal before resuming drinking and Frequent snacking/ grazing    Food allergies or intolerances: none noted   Cultural or Mandaen considerations: n/a     Physical Assessment  Nutrition Related Findings  None noted     Physical Activity  Types of exercise: gym 3-4 times per week   Current physical limitations: none     Psychosocial Assessment   Support systems: spouse  Socioeconomic factors:   5 years ago, got remarried, has moved 5 times in 2 years  Quit her job and helped  with refurbishing her home  Nutrition Diagnosis  Diagnosis: Overweight / Obesity (NC-3 3)  Related to: Physical inactivity and Excessive energy intake  As Evidenced by: BMI >25 and Unintentional weight gain     Interventions and Teaching   Patient educated on post-op nutrition guidelines  Patient educated and handouts provided    Surgical changes to stomach / GI  Capacity of post-surgery stomach  Adequate hydration  Expected weight loss  Weight loss plateaus/ possibility of weight regain  Exercise  Nutrition considerations after surgery  Protein supplements  Meal planning and preparation  Appropriate carbohydrate, protein, and fat intake, and food/fluid choices to maximize safe weight loss, nutrient intake, and tolerance   Dietary and lifestyle changes  Possible problems with poor eating habits  Intuitive eating  Techniques for self monitoring and keeping daily food journal  Vitamin / Mineral supplementation of Multivitamin with minerals, Calcium, Vitamin B12, Iron, Fat Soluble vitamins, Vitamin D and PA--C recommended specific vitamin products to her at last appointment     Education provided to: patient    Barriers to learning: No barriers identified    Readiness to change: action    Comprehension: demonstrated understanding and verbalizes understanding     Expected Compliance: good    Evaluation/Monitoring   Eating pattern as discussed Tolerance of nutrition prescription Body weight Lab values Physical activity    Goals  Food journal, Exercise 30 minutes 5 times per week, Eat 3 meals per day, Eliminate mindless snacking and and include one planned snack   Vitamins and minerals as recommended by PA-C  Food log 1200 calories 65 grams of protein, 135 grams of carb, 45 grams of fat, 15 grams of fiber  1500 mg of calcium, 2300 mg of sodium   Attend one virtual support group by next appointment     Mailed out updated book for reference     F/U in 6 weeks with RD and SW   Time Spent:   30 Minutes

## 2021-01-28 ENCOUNTER — OFFICE VISIT (OUTPATIENT)
Dept: BARIATRICS | Facility: CLINIC | Age: 61
End: 2021-01-28

## 2021-01-28 VITALS — WEIGHT: 199 LBS | HEIGHT: 66 IN | BODY MASS INDEX: 31.98 KG/M2

## 2021-01-28 DIAGNOSIS — Z98.84 BARIATRIC SURGERY STATUS: Primary | ICD-10-CM

## 2021-01-28 PROCEDURE — 3008F BODY MASS INDEX DOCD: CPT | Performed by: PHYSICIAN ASSISTANT

## 2021-01-28 PROCEDURE — RECHECK

## 2021-01-28 NOTE — PROGRESS NOTES
Behavioral Health Follow Up Note:      Post bariatric surgery:  July 2016  RYGB Dr Balbina Madsen weight around 297# and lowest post surgery weight around 160#  Stated over the last years she has engaged in a lot of change:  Got , consolidated homes, sold homes, COVID, moved five times  Feels her relationship with food lost structure and routine  Not identifying emotional eating, but not planning and skipping lunch,    Did join [facility] and removed sugar form her intake, but slowly sugar is returning  History of 12 step program and in recovery  Not tracking her food intake  No awareness as to how much protein she is consuming  Is now back at the gym since they reopened,   Currently living toward [place] area and finding new providers for her health care  Interested in getting  Back on track with her food intake

## 2021-04-13 DIAGNOSIS — Z23 ENCOUNTER FOR IMMUNIZATION: ICD-10-CM

## 2021-04-28 ENCOUNTER — IMMUNIZATIONS (OUTPATIENT)
Dept: FAMILY MEDICINE CLINIC | Facility: HOSPITAL | Age: 61
End: 2021-04-28

## 2021-04-28 DIAGNOSIS — Z23 ENCOUNTER FOR IMMUNIZATION: Primary | ICD-10-CM

## 2021-04-28 PROCEDURE — 0001A SARS-COV-2 / COVID-19 MRNA VACCINE (PFIZER-BIONTECH) 30 MCG: CPT

## 2021-04-28 PROCEDURE — 91300 SARS-COV-2 / COVID-19 MRNA VACCINE (PFIZER-BIONTECH) 30 MCG: CPT

## 2021-05-25 ENCOUNTER — IMMUNIZATIONS (OUTPATIENT)
Dept: FAMILY MEDICINE CLINIC | Facility: HOSPITAL | Age: 61
End: 2021-05-25

## 2021-05-25 DIAGNOSIS — Z23 ENCOUNTER FOR IMMUNIZATION: Primary | ICD-10-CM

## 2021-05-25 PROCEDURE — 91300 SARS-COV-2 / COVID-19 MRNA VACCINE (PFIZER-BIONTECH) 30 MCG: CPT

## 2021-05-25 PROCEDURE — 0002A SARS-COV-2 / COVID-19 MRNA VACCINE (PFIZER-BIONTECH) 30 MCG: CPT

## 2021-06-16 LAB
25(OH)D3 SERPL-MCNC: 35 NG/ML (ref 30–100)
ALBUMIN SERPL-MCNC: 4.1 G/DL (ref 3.6–5.1)
ALBUMIN/GLOB SERPL: 1.6 (CALC) (ref 1–2.5)
ALP SERPL-CCNC: 75 U/L (ref 37–153)
ALT SERPL-CCNC: 17 U/L (ref 6–29)
AST SERPL-CCNC: 17 U/L (ref 10–35)
BASOPHILS # BLD AUTO: 30 CELLS/UL (ref 0–200)
BASOPHILS NFR BLD AUTO: 0.7 %
BILIRUB SERPL-MCNC: 0.4 MG/DL (ref 0.2–1.2)
BUN SERPL-MCNC: 13 MG/DL (ref 7–25)
BUN/CREAT SERPL: NORMAL (CALC) (ref 6–22)
CALCIUM SERPL-MCNC: 9.1 MG/DL (ref 8.6–10.4)
CHLORIDE SERPL-SCNC: 104 MMOL/L (ref 98–110)
CO2 SERPL-SCNC: 30 MMOL/L (ref 20–32)
CREAT SERPL-MCNC: 0.65 MG/DL (ref 0.5–0.99)
EOSINOPHIL # BLD AUTO: 159 CELLS/UL (ref 15–500)
EOSINOPHIL NFR BLD AUTO: 3.7 %
ERYTHROCYTE [DISTWIDTH] IN BLOOD BY AUTOMATED COUNT: 12.6 % (ref 11–15)
FOLATE SERPL-MCNC: >24 NG/ML
GLOBULIN SER CALC-MCNC: 2.6 G/DL (CALC) (ref 1.9–3.7)
GLUCOSE SERPL-MCNC: 89 MG/DL (ref 65–99)
HCT VFR BLD AUTO: 39.2 % (ref 35–45)
HGB BLD-MCNC: 13.2 G/DL (ref 11.7–15.5)
IRON SATN MFR SERPL: 28 % (CALC) (ref 16–45)
IRON SERPL-MCNC: 102 MCG/DL (ref 45–160)
LYMPHOCYTES # BLD AUTO: 1518 CELLS/UL (ref 850–3900)
LYMPHOCYTES NFR BLD AUTO: 35.3 %
MCH RBC QN AUTO: 30.3 PG (ref 27–33)
MCHC RBC AUTO-ENTMCNC: 33.7 G/DL (ref 32–36)
MCV RBC AUTO: 90.1 FL (ref 80–100)
MONOCYTES # BLD AUTO: 340 CELLS/UL (ref 200–950)
MONOCYTES NFR BLD AUTO: 7.9 %
NEUTROPHILS # BLD AUTO: 2253 CELLS/UL (ref 1500–7800)
NEUTROPHILS NFR BLD AUTO: 52.4 %
PLATELET # BLD AUTO: 244 THOUSAND/UL (ref 140–400)
PMV BLD REES-ECKER: 9.4 FL (ref 7.5–12.5)
POTASSIUM SERPL-SCNC: 4.3 MMOL/L (ref 3.5–5.3)
PROT SERPL-MCNC: 6.7 G/DL (ref 6.1–8.1)
RBC # BLD AUTO: 4.35 MILLION/UL (ref 3.8–5.1)
SL AMB EGFR AFRICAN AMERICAN: 112 ML/MIN/1.73M2
SL AMB EGFR NON AFRICAN AMERICAN: 96 ML/MIN/1.73M2
SODIUM SERPL-SCNC: 140 MMOL/L (ref 135–146)
TIBC SERPL-MCNC: 368 MCG/DL (CALC) (ref 250–450)
VIT A SERPL-MCNC: 42 MCG/DL (ref 38–98)
VIT B1 BLD-SCNC: 123 NMOL/L (ref 78–185)
WBC # BLD AUTO: 4.3 THOUSAND/UL (ref 3.8–10.8)
ZINC SERPL-MCNC: 66 MCG/DL (ref 60–130)

## 2023-03-21 ENCOUNTER — OFFICE VISIT (OUTPATIENT)
Dept: BARIATRICS | Facility: CLINIC | Age: 63
End: 2023-03-21

## 2023-03-21 VITALS
HEART RATE: 80 BPM | HEIGHT: 66 IN | DIASTOLIC BLOOD PRESSURE: 84 MMHG | SYSTOLIC BLOOD PRESSURE: 142 MMHG | WEIGHT: 222.5 LBS | BODY MASS INDEX: 35.76 KG/M2 | TEMPERATURE: 97.7 F

## 2023-03-21 DIAGNOSIS — Z48.815 ENCOUNTER FOR SURGICAL AFTERCARE FOLLOWING SURGERY OF DIGESTIVE SYSTEM: Primary | ICD-10-CM

## 2023-03-21 DIAGNOSIS — K91.2 POSTSURGICAL MALABSORPTION: ICD-10-CM

## 2023-03-21 DIAGNOSIS — E66.9 OBESITY, CLASS II, BMI 35-39.9: ICD-10-CM

## 2023-03-21 DIAGNOSIS — Z98.84 BARIATRIC SURGERY STATUS: ICD-10-CM

## 2023-03-21 NOTE — PROGRESS NOTES
Assessment/Plan:     Patient ID: Yong Pelletier is a 65 y o  female  Bariatric Surgery Status    -s/p Mirna-En-Y Gastric Bypass with Dr Tai Gibson on 7/25/2016    Presents to the office today for OD annual  She has been lost to follow since 2021 due to insurance reasons  She c/o some weight gain since her last visit  She states she could increase her activity in general and also wants to revisit her diet to see where she can improve  She reports increased hunger at times  We discussed referral to [facility] and she states would be interested in this  · Continued/Maintain healthy weight loss with good nutrition intakes  · Adequate hydration with at least 64oz  fluid intake  · Follow diet as discussed  · Follow vitamin and mineral recommendations as reviewed with you  · Exercise as tolerated  · Colonoscopy referral made: overdue - she is in the process of getting a new PCP and has appt in april  · Mammo: overdue     · Follow-up in 1 year  We kindly ask that your arrive 15 minutes before your scheduled appointment time with your provider to allow our staff to room you, get your vital signs and update your chart  · Get lab work done  Please call the office if you need a script  It is recommended to check with your insurance BEFORE getting labs done to make sure they are covered by your policy  · Call our office if you have any problems with abdominal pain especially associated with fever, chills, nausea, vomiting or any other concerns  · All  Post-bariatric surgery patients should be aware that very small quantities of any alcohol can cause impairment and it is very possible not to feel the effect  The effect can be in the system for several hours  It is also a stomach irritant  · It is advised to AVOID alcohol, Nonsteroidal antiinflammatory drugs (NSAIDS) and nicotine of all forms   Any of these can cause stomach irritation/pain      · Discussed the effects of alcohol on a bariatric patient and the increased impairment risk  · Keep up the good work! Postsurgical Malabsorption   -At risk for malabsorption of vitamins/minerals secondary to malabsorption and restriction of intake from bariatric surgery  -Currently taking adequate postop bariatric surgery vitamin supplementation  -Next set of bariatric labs ordered for approximately 2 weeks  -Patient received education about the importance of adhering to a lifelong supplementation regimen to avoid vitamin/mineral deficiencies      Diagnoses and all orders for this visit:    Encounter for surgical aftercare following surgery of digestive system  -     Zinc; Future  -     Vitamin D 25 hydroxy; Future  -     Vitamin B12; Future  -     Vitamin B1, whole blood; Future  -     Vitamin A; Future  -     PTH, intact; Future  -     CBC and Platelet; Future  -     Comprehensive metabolic panel; Future  -     Ferritin; Future  -     Folate; Future  -     Iron Saturation %; Future    Bariatric surgery status  -     Zinc; Future  -     Vitamin D 25 hydroxy; Future  -     Vitamin B12; Future  -     Vitamin B1, whole blood; Future  -     Vitamin A; Future  -     PTH, intact; Future  -     CBC and Platelet; Future  -     Comprehensive metabolic panel; Future  -     Ferritin; Future  -     Folate; Future  -     Iron Saturation %; Future    Postsurgical malabsorption  -     Zinc; Future  -     Vitamin D 25 hydroxy; Future  -     Vitamin B12; Future  -     Vitamin B1, whole blood; Future  -     Vitamin A; Future  -     PTH, intact; Future  -     CBC and Platelet; Future  -     Comprehensive metabolic panel; Future  -     Ferritin; Future  -     Folate; Future  -     Iron Saturation %; Future    Obesity, Class II, BMI 35-39 9  -     Zinc; Future  -     Vitamin D 25 hydroxy; Future  -     Vitamin B12; Future  -     Vitamin B1, whole blood; Future  -     Vitamin A; Future  -     PTH, intact; Future  -     CBC and Platelet;  Future  -     Comprehensive metabolic panel; Future  -     Ferritin; Future  -     Folate; Future  -     Iron Saturation %; Future         Subjective:      Patient ID: Alfred Craft is a 65 y o  female  -s/p Mirna-En-Y Gastric Bypass with Dr Mayra Almaguer on 7/25/2016    Presents to the office today for OD annual olerating diet without issues; denies N/V, dysphagia, reflux  Initial: 297 4  Current:222  EWL: (Weight loss is ahead of schedule at this post surgical period )  Jossue: 175  Current BMI is Body mass index is 35 91 kg/m²  · Tolerating a regular diet-yes  · Eating at least 60 grams of protein per day-yes  · Following 30/60 minute rule with liquids-inconsistent   · Drinking at least 64 ounces of fluid per day-yes  · Drinking carbonated beverages-no  · Sufficient exercise-no, could improve   · Using NSAIDs regularly-no  · Using nicotine-no  · Using alcohol-no  · Supplements: natalie mvi + calcium chews + zinc     · EWL is 53%, which places the patient ahead of schedule for expected post surgical weight loss at this time  The following portions of the patient's history were reviewed and updated as appropriate: allergies, current medications, past family history, past medical history, past social history, past surgical history and problem list     Review of Systems   Constitutional: Negative  Respiratory: Negative  Cardiovascular: Negative  Gastrointestinal: Negative  Neurological: Negative  Psychiatric/Behavioral: Negative  Objective:    /84   Pulse 80   Temp 97 7 °F (36 5 °C) (Tympanic)   Ht 5' 6" (1 676 m)   Wt 101 kg (222 lb 8 oz)   LMP 07/25/2016   BMI 35 91 kg/m²      Physical Exam  Vitals and nursing note reviewed  Constitutional:       Appearance: Normal appearance  She is obese  HENT:      Head: Normocephalic and atraumatic  Eyes:      Extraocular Movements: Extraocular movements intact  Pupils: Pupils are equal, round, and reactive to light     Cardiovascular:      Rate and Rhythm: Normal rate and regular rhythm  Pulmonary:      Effort: Pulmonary effort is normal       Breath sounds: Normal breath sounds  Abdominal:      General: Bowel sounds are normal       Tenderness: There is no abdominal tenderness  Musculoskeletal:         General: Normal range of motion  Cervical back: Normal range of motion  Skin:     General: Skin is warm and dry  Neurological:      General: No focal deficit present  Mental Status: She is alert and oriented to person, place, and time     Psychiatric:         Mood and Affect: Mood normal

## 2023-04-28 LAB
25(OH)D3 SERPL-MCNC: 32 NG/ML (ref 30–100)
ALBUMIN SERPL-MCNC: 3.9 G/DL (ref 3.6–5.1)
ALBUMIN/GLOB SERPL: 1.4 (CALC) (ref 1–2.5)
ALP SERPL-CCNC: 84 U/L (ref 37–153)
ALT SERPL-CCNC: 14 U/L (ref 6–29)
AST SERPL-CCNC: 16 U/L (ref 10–35)
BILIRUB SERPL-MCNC: 0.3 MG/DL (ref 0.2–1.2)
BUN SERPL-MCNC: 12 MG/DL (ref 7–25)
BUN/CREAT SERPL: NORMAL (CALC) (ref 6–22)
CALCIUM SERPL-MCNC: 9.1 MG/DL (ref 8.6–10.4)
CALCIUM SERPL-MCNC: 9.1 MG/DL (ref 8.6–10.4)
CHLORIDE SERPL-SCNC: 105 MMOL/L (ref 98–110)
CO2 SERPL-SCNC: 30 MMOL/L (ref 20–32)
CREAT SERPL-MCNC: 0.72 MG/DL (ref 0.5–1.05)
FERRITIN SERPL-MCNC: 7 NG/ML (ref 16–288)
FOLATE SERPL-MCNC: 10.2 NG/ML
GFR/BSA.PRED SERPLBLD CYS-BASED-ARV: 95 ML/MIN/1.73M2
GLOBULIN SER CALC-MCNC: 2.8 G/DL (CALC) (ref 1.9–3.7)
GLUCOSE SERPL-MCNC: 91 MG/DL (ref 65–99)
IRON SATN MFR SERPL: 19 % (CALC) (ref 16–45)
IRON SERPL-MCNC: 73 MCG/DL (ref 45–160)
POTASSIUM SERPL-SCNC: 4.4 MMOL/L (ref 3.5–5.3)
PROT SERPL-MCNC: 6.7 G/DL (ref 6.1–8.1)
PTH-INTACT SERPL-MCNC: 56 PG/ML (ref 16–77)
SODIUM SERPL-SCNC: 141 MMOL/L (ref 135–146)
TIBC SERPL-MCNC: 392 MCG/DL (CALC) (ref 250–450)
VIT A SERPL-MCNC: 36 MCG/DL (ref 38–98)
VIT B1 BLD-SCNC: 103 NMOL/L (ref 78–185)
VIT B12 SERPL-MCNC: 908 PG/ML (ref 200–1100)
ZINC SERPL-MCNC: 79 MCG/DL (ref 60–130)

## 2023-05-01 ENCOUNTER — TELEPHONE (OUTPATIENT)
Dept: BARIATRICS | Facility: CLINIC | Age: 63
End: 2023-05-01

## 2023-05-01 DIAGNOSIS — K91.2 POSTSURGICAL MALABSORPTION: ICD-10-CM

## 2023-05-01 DIAGNOSIS — R79.0 LOW FERRITIN: ICD-10-CM

## 2023-05-01 DIAGNOSIS — Z98.84 BARIATRIC SURGERY STATUS: Primary | ICD-10-CM

## 2023-05-01 DIAGNOSIS — E50.9 VITAMIN A DEFICIENCY: ICD-10-CM

## 2023-05-01 NOTE — TELEPHONE ENCOUNTER
----- Message from Bere Naz sent at 5/1/2023 11:58 AM EDT -----  Please call patient with her results and let her know I am covering for kd in the meantime      - Her vitamin A level is mildly low  I would recommend adding retinyl acetate or palmitate 10,000 IU daily for 12 weeks  After 12 weeks, please stop this as it can cause toxicity  This is over the counter for her to get  - Iron storage is low but iron levels are normal  Please add iron 45-65 mg once daily to help with this  - the rest are normal limits  I will place repeat vitamin A and iron labs in 3 months to reevaluate       Thank you  EVANGELINA Suazo

## 2023-05-01 NOTE — TELEPHONE ENCOUNTER
Reached out to Pt re: labs  Reviewed with Pt results and CRNP recommendations  Pt verbalized understanding and was agreeable to plan

## 2023-06-07 ENCOUNTER — OFFICE VISIT (OUTPATIENT)
Dept: BARIATRICS | Facility: CLINIC | Age: 63
End: 2023-06-07
Payer: COMMERCIAL

## 2023-06-07 VITALS
HEIGHT: 66 IN | HEART RATE: 78 BPM | DIASTOLIC BLOOD PRESSURE: 80 MMHG | BODY MASS INDEX: 35.87 KG/M2 | SYSTOLIC BLOOD PRESSURE: 130 MMHG | WEIGHT: 223.2 LBS | RESPIRATION RATE: 16 BRPM

## 2023-06-07 DIAGNOSIS — F43.29 STRESS AND ADJUSTMENT REACTION: ICD-10-CM

## 2023-06-07 DIAGNOSIS — Z98.84 HISTORY OF BARIATRIC SURGERY: ICD-10-CM

## 2023-06-07 DIAGNOSIS — E55.9 VITAMIN D INSUFFICIENCY: ICD-10-CM

## 2023-06-07 DIAGNOSIS — E61.1 IRON DEFICIENCY: ICD-10-CM

## 2023-06-07 DIAGNOSIS — E66.9 OBESITY (BMI 30-39.9): Primary | ICD-10-CM

## 2023-06-07 PROBLEM — R12 HEART BURN: Status: RESOLVED | Noted: 2021-01-12 | Resolved: 2023-06-07

## 2023-06-07 PROBLEM — E66.811 OBESITY, CLASS I, BMI 30-34.9: Status: RESOLVED | Noted: 2019-09-06 | Resolved: 2023-06-07

## 2023-06-07 PROBLEM — Z48.815 ENCOUNTER FOR SURGICAL AFTERCARE FOLLOWING SURGERY OF DIGESTIVE SYSTEM: Status: RESOLVED | Noted: 2021-01-12 | Resolved: 2023-06-07

## 2023-06-07 PROCEDURE — 99204 OFFICE O/P NEW MOD 45 MIN: CPT | Performed by: INTERNAL MEDICINE

## 2023-06-07 RX ORDER — UREA 10 %
1 LOTION (ML) TOPICAL DAILY
COMMUNITY

## 2023-06-07 RX ORDER — BUPROPION HYDROCHLORIDE 150 MG/1
150 TABLET ORAL DAILY
Qty: 30 TABLET | Refills: 1 | Status: SHIPPED | OUTPATIENT
Start: 2023-06-07

## 2023-06-07 RX ORDER — ZINC GLUCONATE 50 MG
50 TABLET ORAL DAILY
COMMUNITY

## 2023-06-07 RX ORDER — NALTREXONE HYDROCHLORIDE 50 MG/1
50 TABLET, FILM COATED ORAL DAILY
Qty: 15 TABLET | Refills: 1 | Status: SHIPPED | OUTPATIENT
Start: 2023-06-07

## 2023-06-07 NOTE — PROGRESS NOTES
"Assessment/Plan:  Tegan Garcia was seen today for consult  Diagnoses and all orders for this visit:    Obesity (BMI 30-39 9)  -     buPROPion (Wellbutrin XL) 150 mg 24 hr tablet; Take 1 tablet (150 mg total) by mouth daily  -     naltrexone (REVIA) 50 mg tablet; Take 1 tablet (50 mg total) by mouth daily    History of bariatric surgery    Iron deficiency    Vitamin D insufficiency    Stress and adjustment reaction       Stress and adjustment reaction  Recommend discussing with her PCP and establishing with a therapist/CBT    Obesity (BMI 30-39  9)  No phentermine due to history  Semaglutide would be a good option however this is currently on shortage  Patient declines daily injection  Contrave is not covered by her insurance but she is willing to take the generic version of the ingredients, off label to assist with weight loss  Given that she feels she is addicted to food and has benefited from programs such as overeaters Anonymous in the past she believes that this would be most beneficial     START bupropion 150mg daily and natrexone 25mg daily  You are beng started on \"contrave\" or on the generic versions of its components at approximated dosages  If you develop abdominal upset, headache, dizziness, trouble sleeping, increased blood pressure, depression, anxiety, and fatigue, then you must contact the office right away  If you develop thoughts of harming yourself or others then stop the medication right away and go to the Emergency Room  Meet with  for behavioral health assessment  General Recommendations:  Nutrition:  Eat breakfast daily  Do not skip meals  Food log (ie ) www myfitnesspal com, sparkpeople  com, loseit com, calorieking  com, etc     Practice mindful eating  Be sure to set aside time to eat, eat slowly, and savor your food  Hydration: At least 64oz of water daily  No sugar sweetened beverages  No juice (eat the fruit instead)      Exercise:  Studies have shown " that the ideal exercise goal is somewhere between 150 to 300 minutes of moderate intensity exercise a week  Start with exercising 10 minutes every other day and gradually increase physical activity with a goal of at least 150 minutes of moderate intensity exercise a week, divided over at least 3 days a week  An example of this would be exercising 30 minutes a day, 5 days a week  Resistance training can increase muscle mass and increase our resting metabolic rate  FULL BODY resistance training is recommended 2-3 times a week  Do not do this on consecutive days to allow for muscle recovery  Aim for a bare minimum 5000 steps, even on days you do not exercise  Monitoring:   Weigh yourself daily  If this causes undue stress, then just weigh yourself once a week  Weigh yourself the same time of the day with the same amount of clothing on  Preferably this should be done after waking up, before you eat, and with no clothing or minimal clothing on  Calorie goal: 6060-9341 calories a day (Provided with meal plan to follow)  Return visit:  3 months             Total time spent reviewing chart, interviewing patient, examining patient, discussing plan, answering all questions, and documentin min        ______________________________________________________________________      Subjective:   Chief Complaint   Patient presents with   • Consult     MWM Consult; Goal Wt-180lb ; Bret Epstein ; Pt had sleep apnea surgery      Patient here to discuss weight associated problems and nutrition goals  HPI: Rosa Zambrano  is a 59 y o  female with history of Mirna-en-Y gastric bypass by Dr Chacho Michael on 2016, iron deficiency, vitamin D deficiency and excess weight  Patient was last seen by the surgical team on 3/21/2023  Reported compliance with bariatric vitamin and mineral supplementation  Labs ordered    Highest weight 330, Went to [facility], cut out sugar and lost 50lbs  Preoperative weight 297 4  Jossue "175  Current weight 223  Goal 180  She attributes weight regain to stress  She lost her first  before having bariatric surgery  She then got remarried  As a result of COVID her second  lost his job and has taken on various other positions  They have had to relocate a total of 5 times  She started working which she intends to do until she qualifies for Medicare  She reports that work is particularly stressful and she feels that she is being targeted at work  She tells me that she feels that she is addicted to food  When she was younger she used \"uppers,\" \"downers,\" marijuana and mushrooms  She reports an obvious issue with stress eating and difficulty controlling her eating  Wt Readings from Last 10 Encounters:   06/07/23 101 kg (223 lb 3 2 oz)   03/21/23 101 kg (222 lb 8 oz)   01/28/21 90 3 kg (199 lb)   09/06/19 85 7 kg (189 lb)   04/05/19 87 kg (191 lb 12 8 oz)   09/07/18 85 3 kg (188 lb)   08/28/18 84 4 kg (186 lb)   03/15/18 80 3 kg (177 lb)   09/21/17 77 8 kg (171 lb 8 oz)   08/21/17 79 4 kg (175 lb)     Hunger/Cravings: + Sugar/candy  Dining out:  \"a lot\"   Hydration: Water 32oz  Alcohol:  No  Exercise: No  Sleep:  7+  Weight Monitoring:    Occupation: Works at the [employer]  [employer] [facility] [employer]    Patient denies personal and family history of  pancreatitis, thyroid cancer, MEN-2 tumors  Denies any hx of glaucoma, seizures, kidney stones, gallstones  Denies Hx of CAD, PAD, palpitations, arrhythmia  Denies uncontrolled anxiety or depression, suicidal ideation or behavior, insomnia or sleep disturbance  The following portions of the patient's history were reviewed and updated as appropriate: allergies, current medications, past family history, past medical history, past social history, past surgical history, and problem list     Review Of Systems:  Review of Systems   Constitutional: Negative for activity change, appetite change, fatigue and fever     Respiratory: Negative " "for cough and shortness of breath  Cardiovascular: Negative for chest pain, palpitations and leg swelling  Gastrointestinal: Negative for abdominal pain, constipation, diarrhea, nausea and vomiting  Endocrine: Negative for cold intolerance and heat intolerance  Genitourinary: Negative for difficulty urinating and dysuria  Musculoskeletal: Negative for arthralgias, back pain and gait problem  Skin: Negative for pallor and rash  Neurological: Negative for headaches  Psychiatric/Behavioral: Negative for dysphoric mood, sleep disturbance and suicidal ideas (or HI)  The patient is not nervous/anxious  Objective:  /80   Pulse 78   Resp 16   Ht 5' 6\" (1 676 m)   Wt 101 kg (223 lb 3 2 oz)   LMP 07/25/2016   BMI 36 03 kg/m²   Physical Exam  Vitals and nursing note reviewed  Constitutional:       General: She is not in acute distress  Appearance: Normal appearance  She is not ill-appearing or diaphoretic  Eyes:      General: No scleral icterus  Cardiovascular:      Rate and Rhythm: Normal rate and regular rhythm  Pulses: Normal pulses  Heart sounds: No murmur heard  Pulmonary:      Effort: Pulmonary effort is normal  No respiratory distress  Breath sounds: Normal breath sounds  No wheezing or rhonchi  Abdominal:      General: Bowel sounds are normal  There is no distension  Palpations: Abdomen is soft  There is no mass  Tenderness: There is no abdominal tenderness  Musculoskeletal:      Cervical back: Neck supple  Right lower leg: No edema  Left lower leg: No edema  Lymphadenopathy:      Cervical: No cervical adenopathy  Skin:     Capillary Refill: Capillary refill takes less than 2 seconds  Findings: No lesion or rash  Neurological:      Mental Status: She is alert and oriented to person, place, and time        Gait: Gait normal    Psychiatric:         Mood and Affect: Mood normal          Behavior: Behavior normal        Labs and " Imaging  Recent labs and imaging have been personally reviewed    Lab Results   Component Value Date    HCT 39 2 06/10/2021    HGB 13 2 06/10/2021    MCV 90 1 06/10/2021     06/10/2021    WBC 4 3 06/10/2021     Lab Results   Component Value Date    AGAP 3 (L) 09/13/2019    ALKPHOS 84 04/21/2023    ALT 14 04/21/2023    AST 16 04/21/2023    BUN 12 04/21/2023    CALCIUM 9 1 04/21/2023    CALCIUM 9 1 04/21/2023     04/21/2023    CO2 30 04/21/2023    CREATININE 0 72 04/21/2023    EGFR 95 04/21/2023    GLUC 91 04/21/2023    GLUF 89 09/13/2019    K 4 4 04/21/2023    SODIUM 141 04/21/2023    TBILI 0 3 04/21/2023    TP 6 7 04/21/2023     Lab Results   Component Value Date    HGBA1C 5 5 04/21/2023     Lab Results   Component Value Date    GBM0JEHEHFQJ 1 250 08/28/2018     Lab Results   Component Value Date    CHOLESTEROL 170 09/13/2019     Lab Results   Component Value Date    HDL 63 (H) 09/13/2019     Lab Results   Component Value Date    TRIG 75 09/13/2019     Lab Results   Component Value Date    LDLCALC 92 09/13/2019

## 2023-06-07 NOTE — ASSESSMENT & PLAN NOTE
"No phentermine due to history  Semaglutide would be a good option however this is currently on shortage  Patient declines daily injection  Contrave is not covered by her insurance but she is willing to take the generic version of the ingredients, off label to assist with weight loss  Given that she feels she is addicted to food and has benefited from programs such as overeaters Anonymous in the past she believes that this would be most beneficial     START bupropion 150mg daily and natrexone 25mg daily  You are beng started on \"contrave\" or on the generic versions of its components at approximated dosages  If you develop abdominal upset, headache, dizziness, trouble sleeping, increased blood pressure, depression, anxiety, and fatigue, then you must contact the office right away  If you develop thoughts of harming yourself or others then stop the medication right away and go to the Emergency Room  Meet with  for behavioral health assessment  General Recommendations:  Nutrition:  Eat breakfast daily  Do not skip meals  Food log (ie ) www myfitnesspal com, sparkpeople  com, loseit com, calorieking  com, etc     Practice mindful eating  Be sure to set aside time to eat, eat slowly, and savor your food  Hydration: At least 64oz of water daily  No sugar sweetened beverages  No juice (eat the fruit instead)  Exercise:  Studies have shown that the ideal exercise goal is somewhere between 150 to 300 minutes of moderate intensity exercise a week  Start with exercising 10 minutes every other day and gradually increase physical activity with a goal of at least 150 minutes of moderate intensity exercise a week, divided over at least 3 days a week  An example of this would be exercising 30 minutes a day, 5 days a week  Resistance training can increase muscle mass and increase our resting metabolic rate  FULL BODY resistance training is recommended 2-3 times a week    Do not do this on " consecutive days to allow for muscle recovery  Aim for a bare minimum 5000 steps, even on days you do not exercise  Monitoring:   Weigh yourself daily  If this causes undue stress, then just weigh yourself once a week  Weigh yourself the same time of the day with the same amount of clothing on  Preferably this should be done after waking up, before you eat, and with no clothing or minimal clothing on  Calorie goal: 0264-6947 calories a day (Provided with meal plan to follow)      Return visit:  3 months

## 2023-06-07 NOTE — PATIENT INSTRUCTIONS
"START bupropion 150mg daily and natrexone 25mg daily  You are beng started on \"contrave\" or on the generic versions of its components at approximated dosages  If you develop abdominal upset, headache, dizziness, trouble sleeping, increased blood pressure, depression, anxiety, and fatigue, then you must contact the office right away  If you develop thoughts of harming yourself or others then stop the medication right away and go to the Emergency Room  Meet with  for behavioral health assessment  General Recommendations:  Nutrition:  Eat breakfast daily  Do not skip meals  Food log (ie ) www myfitnesspal com, sparkpeople  com, loseit com, calorieking  com, etc     Practice mindful eating  Be sure to set aside time to eat, eat slowly, and savor your food  Hydration: At least 64oz of water daily  No sugar sweetened beverages  No juice (eat the fruit instead)  Exercise:  Studies have shown that the ideal exercise goal is somewhere between 150 to 300 minutes of moderate intensity exercise a week  Start with exercising 10 minutes every other day and gradually increase physical activity with a goal of at least 150 minutes of moderate intensity exercise a week, divided over at least 3 days a week  An example of this would be exercising 30 minutes a day, 5 days a week  Resistance training can increase muscle mass and increase our resting metabolic rate  FULL BODY resistance training is recommended 2-3 times a week  Do not do this on consecutive days to allow for muscle recovery  Aim for a bare minimum 5000 steps, even on days you do not exercise  Monitoring:   Weigh yourself daily  If this causes undue stress, then just weigh yourself once a week  Weigh yourself the same time of the day with the same amount of clothing on  Preferably this should be done after waking up, before you eat, and with no clothing or minimal clothing on      Calorie goal: 7836-8113 calories a day " (Provided with meal plan to follow)      Return visit:  3 months

## 2024-02-21 PROBLEM — Z12.11 SCREEN FOR COLON CANCER: Status: RESOLVED | Noted: 2018-08-28 | Resolved: 2024-02-21

## 2024-04-02 ENCOUNTER — OFFICE VISIT (OUTPATIENT)
Dept: BARIATRICS | Facility: CLINIC | Age: 64
End: 2024-04-02
Payer: COMMERCIAL

## 2024-04-02 VITALS
DIASTOLIC BLOOD PRESSURE: 84 MMHG | HEIGHT: 66 IN | TEMPERATURE: 97 F | BODY MASS INDEX: 34.07 KG/M2 | HEART RATE: 75 BPM | SYSTOLIC BLOOD PRESSURE: 126 MMHG | WEIGHT: 212 LBS

## 2024-04-02 DIAGNOSIS — E61.1 IRON DEFICIENCY: ICD-10-CM

## 2024-04-02 DIAGNOSIS — Z98.84 BARIATRIC SURGERY STATUS: ICD-10-CM

## 2024-04-02 DIAGNOSIS — Z48.815 ENCOUNTER FOR SURGICAL AFTERCARE FOLLOWING SURGERY OF DIGESTIVE SYSTEM: Primary | ICD-10-CM

## 2024-04-02 DIAGNOSIS — E66.9 OBESITY, CLASS I, BMI 30-34.9: ICD-10-CM

## 2024-04-02 DIAGNOSIS — K91.2 POSTSURGICAL MALABSORPTION: ICD-10-CM

## 2024-04-02 PROCEDURE — 99214 OFFICE O/P EST MOD 30 MIN: CPT | Performed by: NURSE PRACTITIONER

## 2024-04-02 RX ORDER — LANOLIN ALCOHOL/MO/W.PET/CERES
CREAM (GRAM) TOPICAL DAILY
COMMUNITY

## 2024-04-02 RX ORDER — LANOLIN ALCOHOL/MO/W.PET/CERES
325 CREAM (GRAM) TOPICAL
COMMUNITY

## 2024-04-02 NOTE — PROGRESS NOTES
Assessment/Plan:     Patient ID: Anita Lane is a 63 y.o. female.     Bariatric Surgery Status/BMI 34    -s/p Mirna-En-Y Gastric Bypass with Dr. Rodriguez on 07/25/2016. Presents to the office today for Annual visit. Overall doing fair - had c/o of weight gain and followed with our MWM but due to distance she was unable to continue with our follow ups. She is doing weight watchers and has been helping her with her weight loss.  She is down about 10 lbs since last visit. She denies having any abdominal pain, N/V/D/C, regurgitation, reflux or dysphagia. Taking her MVI daily.     Iron deficiency - low levels from last labs. She is currently taking iron supplements 3 times per day. Will repeat levels.       PLAN:     - Routine follow up in 1 year for annual visit  - Continue with healthy lifestyle, adequate protein intake of 60 gm, fluid intake of at least 64 oz.   - Continue with MVI daily.   - Activity as tolerated.   - Labs ordered and will adjust accordingly if any deficiency.   - Follow up with RD and SW as needed.   .    Continued/Maintain healthy weight loss with good nutrition intakes.  Adequate hydration with at least 64oz. fluid intake.  Follow diet as discussed.  Follow vitamin and mineral recommendations as reviewed with you.  Exercise as tolerated.    Colonoscopy referral made:  UTD - done at [facility].  Mammogram - Due    Follow-up in 1 year for annual visit. We kindly ask that your arrive 15 minutes before your scheduled appointment time with your provider to allow our staff to room you, get your vital signs and update your chart.    Get lab work done prior to annual visit. Please call the office if you need a script.  It is recommended to check with your insurance BEFORE getting labs done to make sure they are covered by your policy.      Call our office if you have any problems with abdominal pain especially associated with fever, chills, nausea, vomiting or any other concerns.    All   Post-bariatric surgery patients should be aware that very small quantities of any alcohol can cause impairment and it is very possible not to feel the effect. The effect can be in the system for several hours.  It is also a stomach irritant.     It is advised to AVOID alcohol, Nonsteroidal antiinflammatory drugs (NSAIDS) and nicotine of all forms . Any of these can cause stomach irritation/pain.    Discussed the effects of alcohol on a bariatric patient and the increased impairment risk.     Keep up the good work!     Postsurgical Malabsorption   -At risk for malabsorption of vitamins/minerals secondary to malabsorption and restriction of intake from bariatric surgery  -Currently taking adequate postop bariatric surgery vitamin supplementation  -Last set of bariatric labs completed on 04/2023 and showed low vitamin A, low iron.   -Next set of bariatric labs ordered for approximately 2 weeks  -Patient received education about the importance of adhering to a lifelong supplementation regimen to avoid vitamin/mineral deficiencies      Diagnoses and all orders for this visit:    Encounter for surgical aftercare following surgery of digestive system  -     CBC; Future  -     Comprehensive metabolic panel; Future  -     Folate; Future  -     Iron Panel (Includes Ferritin, Iron Sat%, Iron, and TIBC); Future  -     PTH, intact; Future  -     Vitamin A; Future  -     Vitamin B1, whole blood; Future  -     Vitamin B12; Future  -     Vitamin D 25 hydroxy; Future  -     Zinc; Future    Bariatric surgery status  -     CBC; Future  -     Comprehensive metabolic panel; Future  -     Folate; Future  -     Iron Panel (Includes Ferritin, Iron Sat%, Iron, and TIBC); Future  -     PTH, intact; Future  -     Vitamin A; Future  -     Vitamin B1, whole blood; Future  -     Vitamin B12; Future  -     Vitamin D 25 hydroxy; Future  -     Zinc; Future    Postsurgical malabsorption  -     CBC; Future  -     Comprehensive metabolic panel;  Future  -     Folate; Future  -     Iron Panel (Includes Ferritin, Iron Sat%, Iron, and TIBC); Future  -     PTH, intact; Future  -     Vitamin A; Future  -     Vitamin B1, whole blood; Future  -     Vitamin B12; Future  -     Vitamin D 25 hydroxy; Future  -     Zinc; Future    Obesity, Class I, BMI 30-34.9  -     CBC; Future  -     Comprehensive metabolic panel; Future  -     Folate; Future  -     Iron Panel (Includes Ferritin, Iron Sat%, Iron, and TIBC); Future  -     PTH, intact; Future  -     Vitamin A; Future  -     Vitamin B1, whole blood; Future  -     Vitamin B12; Future  -     Vitamin D 25 hydroxy; Future  -     Zinc; Future    BMI 34.0-34.9,adult    Iron deficiency    Other orders  -     vitamin B-12 (VITAMIN B-12) 1,000 mcg tablet; Take by mouth daily  -     ferrous sulfate 325 (65 FE) MG EC tablet; Take 325 mg by mouth 3 (three) times a day with meals  -     vitamin A 3 MG (97815 UT) capsule; Take 10,000 Units by mouth daily         Subjective:      Patient ID: Anita Lane is a 63 y.o. female.      -s/p Mirna-En-Y Gastric Bypass with Dr. Rodriguez on 07/25/2016. Presents to the office today for Annual visit. Overall doing fair - had c/o of weight gain and followed with our MWM but due to distance she was unable to continue with our follow ups. She is doing weight watchers and has been helping her with her weight loss.  She is down about 10 lbs since last visit. She denies having any abdominal pain, N/V/D/C, regurgitation, reflux or dysphagia. Taking her MVI daily.     Initial: 297 lbs  Current: 212 lbs   EWL: (Weight loss is ahead of schedule at this post surgical period.)  Jossue: 175 lbs.   Current BMI is Body mass index is 34.22 kg/m².    Tolerating a regular diet-yes  Eating at least 60 grams of protein per day-yes  Following 30/60 minute rule with liquids-yes  Drinking at least 64 ounces of fluid per day-yes  Drinking carbonated beverages-no  Sufficient exercise-yes - exercising 3x per week.  "  Using NSAIDs regularly-no  Using nicotine-no  Using alcohol-no  Supplements:  natalie mvi + calcium chews + zinc  + vitamin A + iron 45 mg two to three times per day.     EWL is 61%, which places the patient ahead of schedule for expected post surgical weight loss at this time.     The following portions of the patient's history were reviewed and updated as appropriate: allergies, current medications, past family history, past medical history, past social history, past surgical history and problem list.    Review of Systems   Constitutional: Negative.    Respiratory: Negative.     Cardiovascular: Negative.    Gastrointestinal: Negative.    Musculoskeletal: Negative.    Neurological: Negative.    Psychiatric/Behavioral: Negative.           Objective:    /84   Pulse 75   Temp (!) 97 °F (36.1 °C) (Tympanic)   Ht 5' 6\" (1.676 m)   Wt 96.2 kg (212 lb)   LMP 07/25/2016   BMI 34.22 kg/m²      Physical Exam  Vitals and nursing note reviewed.   Constitutional:       Appearance: Normal appearance. She is obese.   Cardiovascular:      Rate and Rhythm: Normal rate and regular rhythm.      Pulses: Normal pulses.      Heart sounds: Normal heart sounds.   Pulmonary:      Effort: Pulmonary effort is normal.      Breath sounds: Normal breath sounds.   Abdominal:      General: Bowel sounds are normal.      Palpations: Abdomen is soft.      Tenderness: There is no abdominal tenderness.   Musculoskeletal:         General: Normal range of motion.   Skin:     General: Skin is warm and dry.   Neurological:      General: No focal deficit present.      Mental Status: She is alert and oriented to person, place, and time.   Psychiatric:         Mood and Affect: Mood normal.         Behavior: Behavior normal.         Thought Content: Thought content normal.         Judgment: Judgment normal.             "

## 2024-04-02 NOTE — PATIENT INSTRUCTIONS
- STOP vitamin A.  - obtain labs to monitor the labs. If vitamin A is high, we can continue to keep trending these labs out until it normalizes.   - follow up in 1 year for routine.

## 2024-05-08 LAB
25(OH)D3 SERPL-MCNC: 48 NG/ML (ref 30–100)
ALBUMIN SERPL-MCNC: 4.1 G/DL (ref 3.6–5.1)
ALBUMIN/GLOB SERPL: 1.5 (CALC) (ref 1–2.5)
ALP SERPL-CCNC: 66 U/L (ref 37–153)
ALT SERPL-CCNC: 19 U/L (ref 6–29)
AST SERPL-CCNC: 18 U/L (ref 10–35)
BASOPHILS # BLD AUTO: 31 CELLS/UL (ref 0–200)
BASOPHILS NFR BLD AUTO: 0.6 %
BILIRUB SERPL-MCNC: 0.5 MG/DL (ref 0.2–1.2)
BUN SERPL-MCNC: 19 MG/DL (ref 7–25)
BUN/CREAT SERPL: NORMAL (CALC) (ref 6–22)
CALCIUM SERPL-MCNC: 9.3 MG/DL (ref 8.6–10.4)
CALCIUM SERPL-MCNC: 9.3 MG/DL (ref 8.6–10.4)
CHLORIDE SERPL-SCNC: 104 MMOL/L (ref 98–110)
CO2 SERPL-SCNC: 32 MMOL/L (ref 20–32)
CREAT SERPL-MCNC: 0.64 MG/DL (ref 0.5–1.05)
EOSINOPHIL # BLD AUTO: 102 CELLS/UL (ref 15–500)
EOSINOPHIL NFR BLD AUTO: 2 %
ERYTHROCYTE [DISTWIDTH] IN BLOOD BY AUTOMATED COUNT: 13.1 % (ref 11–15)
FOLATE SERPL-MCNC: 18.6 NG/ML
GFR/BSA.PRED SERPLBLD CYS-BASED-ARV: 100 ML/MIN/1.73M2
GLOBULIN SER CALC-MCNC: 2.7 G/DL (CALC) (ref 1.9–3.7)
GLUCOSE SERPL-MCNC: 86 MG/DL (ref 65–99)
HCT VFR BLD AUTO: 42.4 % (ref 35–45)
HGB BLD-MCNC: 14 G/DL (ref 11.7–15.5)
LYMPHOCYTES # BLD AUTO: 1377 CELLS/UL (ref 850–3900)
LYMPHOCYTES NFR BLD AUTO: 27 %
MCH RBC QN AUTO: 30.1 PG (ref 27–33)
MCHC RBC AUTO-ENTMCNC: 33 G/DL (ref 32–36)
MCV RBC AUTO: 91.2 FL (ref 80–100)
MONOCYTES # BLD AUTO: 515 CELLS/UL (ref 200–950)
MONOCYTES NFR BLD AUTO: 10.1 %
NEUTROPHILS # BLD AUTO: 3075 CELLS/UL (ref 1500–7800)
NEUTROPHILS NFR BLD AUTO: 60.3 %
PLATELET # BLD AUTO: 246 THOUSAND/UL (ref 140–400)
PMV BLD REES-ECKER: 9.9 FL (ref 7.5–12.5)
POTASSIUM SERPL-SCNC: 4.6 MMOL/L (ref 3.5–5.3)
PROT SERPL-MCNC: 6.8 G/DL (ref 6.1–8.1)
PTH-INTACT SERPL-MCNC: 44 PG/ML (ref 16–77)
RBC # BLD AUTO: 4.65 MILLION/UL (ref 3.8–5.1)
SODIUM SERPL-SCNC: 141 MMOL/L (ref 135–146)
VIT A SERPL-MCNC: 52 MCG/DL (ref 38–98)
VIT B1 BLD-SCNC: 116 NMOL/L (ref 78–185)
VIT B12 SERPL-MCNC: 1162 PG/ML (ref 200–1100)
WBC # BLD AUTO: 5.1 THOUSAND/UL (ref 3.8–10.8)
ZINC SERPL-MCNC: 73 MCG/DL (ref 60–130)

## 2024-05-22 ENCOUNTER — TELEPHONE (OUTPATIENT)
Age: 64
End: 2024-05-22

## 2024-05-22 NOTE — TELEPHONE ENCOUNTER
"Patient of EVANGELINA Le. Was last seen in the office on 4/2/24. Had blood work done on 5/2/24. Is asking for results and about her vitamins. Also mentioned she had a  \"major medical episode\". Was transported via ambulance to [facility] for gallbladder problem. Now has drainage tube in place. Will be following up for this with surgeon at [facility] in the future.  "

## 2024-05-28 ENCOUNTER — TELEPHONE (OUTPATIENT)
Dept: BARIATRICS | Facility: CLINIC | Age: 64
End: 2024-05-28

## 2024-05-28 NOTE — TELEPHONE ENCOUNTER
Spoke to pt, let her know her Iron level still active her orders ,she can check her  Iron level thank you .   Patient requests all Lab, Cardiology, and Radiology Results on their Discharge Instructions

## 2024-06-04 ENCOUNTER — TELEPHONE (OUTPATIENT)
Age: 64
End: 2024-06-04

## 2024-06-04 NOTE — TELEPHONE ENCOUNTER
Patient advocate Tia from [facility] calling about patients recent hospital admissions.    Patient recently hospitalized with Gallstones and PE on 2 admissions.     Patient is very concerned about what vitamin regimen she should be taking and would like to clarify with provider so she does not do anything incorrectly especially with what has been going on recently.    Please contact patient  CB#[phone]

## 2024-06-04 NOTE — TELEPHONE ENCOUNTER
Spoke with Pt re: labs. Pt stated she did not have a printed order for an iron panel, which is why she hasn't had a recent iron panel drawn.    Pt is requesting a new order for an iron panel be entered into her chart, at which point office will print and mail to her as she uses [facility] for her lab work.     Pt was agreeable to plan.

## 2024-06-05 DIAGNOSIS — K91.2 POSTSURGICAL MALABSORPTION: Primary | ICD-10-CM

## 2024-06-05 DIAGNOSIS — Z98.84 HISTORY OF BARIATRIC SURGERY: ICD-10-CM

## 2024-06-25 DIAGNOSIS — Z98.84 HISTORY OF BARIATRIC SURGERY: ICD-10-CM

## 2024-06-25 DIAGNOSIS — E61.1 IRON DEFICIENCY: Primary | ICD-10-CM

## 2024-06-25 DIAGNOSIS — K91.2 POSTSURGICAL MALABSORPTION: ICD-10-CM

## 2024-06-25 LAB
FERRITIN SERPL-MCNC: 14 NG/ML (ref 16–288)
IRON SATN MFR SERPL: 25 % (CALC) (ref 16–45)
IRON SERPL-MCNC: 83 MCG/DL (ref 45–160)
TIBC SERPL-MCNC: 329 MCG/DL (CALC) (ref 250–450)

## 2025-04-02 ENCOUNTER — OFFICE VISIT (OUTPATIENT)
Dept: BARIATRICS | Facility: CLINIC | Age: 65
End: 2025-04-02
Payer: COMMERCIAL

## 2025-04-02 VITALS
HEART RATE: 86 BPM | BODY MASS INDEX: 33.27 KG/M2 | DIASTOLIC BLOOD PRESSURE: 84 MMHG | WEIGHT: 207 LBS | TEMPERATURE: 97.7 F | SYSTOLIC BLOOD PRESSURE: 122 MMHG | HEIGHT: 66 IN

## 2025-04-02 DIAGNOSIS — Z98.84 BARIATRIC SURGERY STATUS: ICD-10-CM

## 2025-04-02 DIAGNOSIS — K91.2 POSTSURGICAL MALABSORPTION: ICD-10-CM

## 2025-04-02 DIAGNOSIS — E66.811 OBESITY, CLASS I, BMI 30-34.9: ICD-10-CM

## 2025-04-02 DIAGNOSIS — Z48.815 ENCOUNTER FOR SURGICAL AFTERCARE FOLLOWING SURGERY OF DIGESTIVE SYSTEM: Primary | ICD-10-CM

## 2025-04-02 PROCEDURE — 99214 OFFICE O/P EST MOD 30 MIN: CPT | Performed by: NURSE PRACTITIONER

## 2025-04-02 NOTE — PROGRESS NOTES
Assessment/Plan:     Patient ID: Anita Lane is a 64 y.o. female.     Bariatric Surgery Status/bmi 33    -s/p Mirna-En-Y Gastric Bypass with Dr. Rodriguez on 07/25/2016. Presents to the office today for annual visit. Overall doing well, tolerating a regular diet. Denies having any issues. Frustrated that she tried to switch her care of [facility] (as this is closer to her) but the office would not accept her as her surgery was not done through them. She is planning to move to [place]. She denies having any abdominal pain, N/V/D/C, regurgitation, reflux or dysphagia.     Of note, in may 2024 - had hospital admission for sepsis due to gallbladder and bilateral PE     PLAN:     - emotional support provided. Advised to try to find bariatric accredited institution in [place] if possible.    - Routine follow up in 1 year for annual visit.   - Continue with healthy lifestyle, adequate protein intake of 60 gm, fluid intake of at least 64 oz.   - Continue with MVI daily.   - Activity as tolerated.   - Labs ordered and will adjust accordingly if any deficiency.   - Follow up with RD and SW as needed.       Continued/Maintain healthy weight loss with good nutrition intakes.  Adequate hydration with at least 64oz. fluid intake.  Follow diet as discussed.  Follow vitamin and mineral recommendations as reviewed with you.  Exercise as tolerated.    Colonoscopy referral made: utd - due 2035  Mammogram - utd     Follow-up in 1 year for annual visit. We kindly ask that your arrive 15 minutes before your scheduled appointment time with your provider to allow our staff to room you, get your vital signs and update your chart.    Get lab work done prior to annual visit. Please call the office if you need a script.  It is recommended to check with your insurance BEFORE getting labs done to make sure they are covered by your policy.      Call our office if you have any problems with abdominal pain especially associated with  fever, chills, nausea, vomiting or any other concerns.    All  Post-bariatric surgery patients should be aware that very small quantities of any alcohol can cause impairment and it is very possible not to feel the effect. The effect can be in the system for several hours.  It is also a stomach irritant.     It is advised to AVOID alcohol, Nonsteroidal antiinflammatory drugs (NSAIDS) and nicotine of all forms . Any of these can cause stomach irritation/pain.    Discussed the effects of alcohol on a bariatric patient and the increased impairment risk.     Keep up the good work!     Postsurgical Malabsorption   -At risk for malabsorption of vitamins/minerals secondary to malabsorption and restriction of intake from bariatric surgery  -Currently taking adequate postop bariatric surgery vitamin supplementation - advised to start on calcium  -Last set of bariatric labs completed on 2024 and showed WNL  -Next set of bariatric labs ordered for approximately 2 weeks  -Patient received education about the importance of adhering to a lifelong supplementation regimen to avoid vitamin/mineral deficiencies      Diagnoses and all orders for this visit:    Encounter for surgical aftercare following surgery of digestive system  -     CBC; Future  -     Comprehensive metabolic panel; Future  -     Folate; Future  -     PTH, intact; Future  -     Vitamin A; Future  -     Vitamin B1, whole blood; Future  -     Vitamin B12; Future  -     Vitamin D 25 hydroxy; Future  -     Zinc; Future  -     Iron, TIBC and Ferritin Panel; Future  -     CBC  -     Comprehensive metabolic panel  -     Folate  -     PTH, intact  -     Vitamin A  -     Vitamin B1, whole blood  -     Vitamin B12  -     Vitamin D 25 hydroxy  -     Zinc  -     Iron, TIBC and Ferritin Panel    Bariatric surgery status  -     CBC; Future  -     Comprehensive metabolic panel; Future  -     Folate; Future  -     PTH, intact; Future  -     Vitamin A; Future  -     Vitamin B1,  whole blood; Future  -     Vitamin B12; Future  -     Vitamin D 25 hydroxy; Future  -     Zinc; Future  -     Iron, TIBC and Ferritin Panel; Future  -     CBC  -     Comprehensive metabolic panel  -     Folate  -     PTH, intact  -     Vitamin A  -     Vitamin B1, whole blood  -     Vitamin B12  -     Vitamin D 25 hydroxy  -     Zinc  -     Iron, TIBC and Ferritin Panel    Postsurgical malabsorption  -     CBC; Future  -     Comprehensive metabolic panel; Future  -     Folate; Future  -     PTH, intact; Future  -     Vitamin A; Future  -     Vitamin B1, whole blood; Future  -     Vitamin B12; Future  -     Vitamin D 25 hydroxy; Future  -     Zinc; Future  -     Iron, TIBC and Ferritin Panel; Future  -     CBC  -     Comprehensive metabolic panel  -     Folate  -     PTH, intact  -     Vitamin A  -     Vitamin B1, whole blood  -     Vitamin B12  -     Vitamin D 25 hydroxy  -     Zinc  -     Iron, TIBC and Ferritin Panel    Obesity, Class I, BMI 30-34.9  -     CBC; Future  -     Comprehensive metabolic panel; Future  -     Folate; Future  -     PTH, intact; Future  -     Vitamin A; Future  -     Vitamin B1, whole blood; Future  -     Vitamin B12; Future  -     Vitamin D 25 hydroxy; Future  -     Zinc; Future  -     Iron, TIBC and Ferritin Panel; Future  -     CBC  -     Comprehensive metabolic panel  -     Folate  -     PTH, intact  -     Vitamin A  -     Vitamin B1, whole blood  -     Vitamin B12  -     Vitamin D 25 hydroxy  -     Zinc  -     Iron, TIBC and Ferritin Panel    BMI 33.0-33.9,adult  -     CBC; Future  -     Comprehensive metabolic panel; Future  -     Folate; Future  -     PTH, intact; Future  -     Vitamin A; Future  -     Vitamin B1, whole blood; Future  -     Vitamin B12; Future  -     Vitamin D 25 hydroxy; Future  -     Zinc; Future  -     Iron, TIBC and Ferritin Panel; Future  -     CBC  -     Comprehensive metabolic panel  -     Folate  -     PTH, intact  -     Vitamin A  -     Vitamin B1, whole  "blood  -     Vitamin B12  -     Vitamin D 25 hydroxy  -     Zinc  -     Iron, TIBC and Ferritin Panel         Subjective:      Patient ID: Anita Lane is a 64 y.o. female.    -s/p Mirna-En-Y Gastric Bypass with Dr. Rodriguez on 07/25/2016. Presents to the office today for annual visit. Overall doing well, tolerating a regular diet. Denies having any issues. Frustrated that she tried to switch her care of [facility] (as this is closer to her) but the office would not accept her as her surgery was not done through them. She is planning to move to [place]. She denies having any abdominal pain, N/V/D/C, regurgitation, reflux or dysphagia.       Initial: 297 lbs  Current: 207 lbs   EWL: (Weight loss is ahead of schedule at this post surgical period.)  Jossue: 175 lbs   Current BMI is Body mass index is 33.41 kg/m².    Tolerating a regular diet-yes  Eating at least 60 grams of protein per day-yes  Following 30/60 minute rule with liquids-yes  Drinking at least 64 ounces of fluid per day- No  Drinking carbonated beverages-no  Sufficient exercise-yes   Using NSAIDs regularly-no  Using nicotine-no  Using alcohol-no  Supplements: Multivitamins and Iron  - advised to add calcium     EWL is 65%, which places the patient ahead of schedule for expected post surgical weight loss at this time.     The following portions of the patient's history were reviewed and updated as appropriate: allergies, current medications, past family history, past medical history, past social history, past surgical history and problem list.    Review of Systems   Constitutional: Negative.    Respiratory: Negative.     Cardiovascular: Negative.    Gastrointestinal: Negative.    Musculoskeletal: Negative.    Neurological: Negative.    Psychiatric/Behavioral: Negative.           Objective:    /84 (Patient Position: Sitting, Cuff Size: Standard)   Pulse 86   Temp 97.7 °F (36.5 °C) (Tympanic)   Ht 5' 6\" (1.676 m)   Wt 93.9 kg (207 lb)  "  LMP 07/25/2016   BMI 33.41 kg/m²      Physical Exam  Vitals and nursing note reviewed.   Constitutional:       Appearance: Normal appearance. She is obese.   Cardiovascular:      Rate and Rhythm: Normal rate and regular rhythm.      Pulses: Normal pulses.      Heart sounds: Normal heart sounds.   Pulmonary:      Effort: Pulmonary effort is normal.      Breath sounds: Normal breath sounds.   Abdominal:      General: Bowel sounds are normal.      Palpations: Abdomen is soft.      Tenderness: There is no abdominal tenderness.   Musculoskeletal:         General: Normal range of motion.   Skin:     General: Skin is warm and dry.   Neurological:      General: No focal deficit present.      Mental Status: She is alert and oriented to person, place, and time.   Psychiatric:         Mood and Affect: Mood normal.         Behavior: Behavior normal.         Thought Content: Thought content normal.         Judgment: Judgment normal.

## 2025-04-02 NOTE — PROGRESS NOTES
Date of surgery:7/25/2016  Procedure: RNY   Performing surgeon: Dr. Rodriguez    Initial Weight - 297 lb  Current Weight -207 lb  Jossue Weight - 175lb  Total Body Weight Loss (EWL)- 90.4%  EWL% - 65%  TWB % -30%

## 2025-04-11 LAB
25(OH)D3 SERPL-MCNC: 44 NG/ML (ref 30–100)
ALBUMIN SERPL-MCNC: 4.2 G/DL (ref 3.6–5.1)
ALBUMIN/GLOB SERPL: 1.5 (CALC) (ref 1–2.5)
ALP SERPL-CCNC: 94 U/L (ref 37–153)
ALT SERPL-CCNC: 20 U/L (ref 6–29)
AST SERPL-CCNC: 21 U/L (ref 10–35)
BILIRUB SERPL-MCNC: 0.4 MG/DL (ref 0.2–1.2)
BUN SERPL-MCNC: 14 MG/DL (ref 7–25)
BUN/CREAT SERPL: NORMAL (CALC) (ref 6–22)
CALCIUM SERPL-MCNC: 8.9 MG/DL (ref 8.6–10.4)
CHLORIDE SERPL-SCNC: 104 MMOL/L (ref 98–110)
CO2 SERPL-SCNC: 29 MMOL/L (ref 20–32)
CREAT SERPL-MCNC: 0.7 MG/DL (ref 0.5–1.05)
ERYTHROCYTE [DISTWIDTH] IN BLOOD BY AUTOMATED COUNT: 14.6 % (ref 11–15)
FERRITIN SERPL-MCNC: 12 NG/ML (ref 16–288)
FOLATE SERPL-MCNC: 14.7 NG/ML
GFR/BSA.PRED SERPLBLD CYS-BASED-ARV: 97 ML/MIN/1.73M2
GLOBULIN SER CALC-MCNC: 2.8 G/DL (CALC) (ref 1.9–3.7)
GLUCOSE SERPL-MCNC: 87 MG/DL (ref 65–99)
HCT VFR BLD AUTO: 40.2 % (ref 35–45)
HGB BLD-MCNC: 13.3 G/DL (ref 11.7–15.5)
IRON SATN MFR SERPL: 25 % (CALC) (ref 16–45)
IRON SERPL-MCNC: 99 MCG/DL (ref 45–160)
MCH RBC QN AUTO: 29.6 PG (ref 27–33)
MCHC RBC AUTO-ENTMCNC: 33.1 G/DL (ref 32–36)
MCV RBC AUTO: 89.5 FL (ref 80–100)
PLATELET # BLD AUTO: 250 THOUSAND/UL (ref 140–400)
PMV BLD REES-ECKER: 9.7 FL (ref 7.5–12.5)
POTASSIUM SERPL-SCNC: 4.5 MMOL/L (ref 3.5–5.3)
PROT SERPL-MCNC: 7 G/DL (ref 6.1–8.1)
PTH-INTACT SERPL-MCNC: 42 PG/ML (ref 16–77)
RBC # BLD AUTO: 4.49 MILLION/UL (ref 3.8–5.1)
SODIUM SERPL-SCNC: 140 MMOL/L (ref 135–146)
TIBC SERPL-MCNC: 400 MCG/DL (CALC) (ref 250–450)
VIT B12 SERPL-MCNC: 644 PG/ML (ref 200–1100)
WBC # BLD AUTO: 4.4 THOUSAND/UL (ref 3.8–10.8)

## 2025-04-19 LAB
25(OH)D3 SERPL-MCNC: 44 NG/ML (ref 30–100)
ALBUMIN SERPL-MCNC: 4.2 G/DL (ref 3.6–5.1)
ALBUMIN/GLOB SERPL: 1.5 (CALC) (ref 1–2.5)
ALP SERPL-CCNC: 94 U/L (ref 37–153)
ALT SERPL-CCNC: 20 U/L (ref 6–29)
AST SERPL-CCNC: 21 U/L (ref 10–35)
BILIRUB SERPL-MCNC: 0.4 MG/DL (ref 0.2–1.2)
BUN SERPL-MCNC: 14 MG/DL (ref 7–25)
BUN/CREAT SERPL: NORMAL (CALC) (ref 6–22)
CALCIUM SERPL-MCNC: 8.9 MG/DL (ref 8.6–10.4)
CHLORIDE SERPL-SCNC: 104 MMOL/L (ref 98–110)
CO2 SERPL-SCNC: 29 MMOL/L (ref 20–32)
CREAT SERPL-MCNC: 0.7 MG/DL (ref 0.5–1.05)
ERYTHROCYTE [DISTWIDTH] IN BLOOD BY AUTOMATED COUNT: 14.6 % (ref 11–15)
FERRITIN SERPL-MCNC: 12 NG/ML (ref 16–288)
FOLATE SERPL-MCNC: 14.7 NG/ML
GFR/BSA.PRED SERPLBLD CYS-BASED-ARV: 97 ML/MIN/1.73M2
GLOBULIN SER CALC-MCNC: 2.8 G/DL (CALC) (ref 1.9–3.7)
GLUCOSE SERPL-MCNC: 87 MG/DL (ref 65–99)
HCT VFR BLD AUTO: 40.2 % (ref 35–45)
HGB BLD-MCNC: 13.3 G/DL (ref 11.7–15.5)
IRON SATN MFR SERPL: 25 % (CALC) (ref 16–45)
IRON SERPL-MCNC: 99 MCG/DL (ref 45–160)
MCH RBC QN AUTO: 29.6 PG (ref 27–33)
MCHC RBC AUTO-ENTMCNC: 33.1 G/DL (ref 32–36)
MCV RBC AUTO: 89.5 FL (ref 80–100)
PLATELET # BLD AUTO: 250 THOUSAND/UL (ref 140–400)
PMV BLD REES-ECKER: 9.7 FL (ref 7.5–12.5)
POTASSIUM SERPL-SCNC: 4.5 MMOL/L (ref 3.5–5.3)
PROT SERPL-MCNC: 7 G/DL (ref 6.1–8.1)
PTH-INTACT SERPL-MCNC: 42 PG/ML (ref 16–77)
RBC # BLD AUTO: 4.49 MILLION/UL (ref 3.8–5.1)
SODIUM SERPL-SCNC: 140 MMOL/L (ref 135–146)
TIBC SERPL-MCNC: 400 MCG/DL (CALC) (ref 250–450)
VIT A SERPL-MCNC: 37 MCG/DL (ref 38–98)
VIT B1 BLD-SCNC: 142 NMOL/L (ref 78–185)
VIT B12 SERPL-MCNC: 644 PG/ML (ref 200–1100)
WBC # BLD AUTO: 4.4 THOUSAND/UL (ref 3.8–10.8)
ZINC SERPL-MCNC: 68 MCG/DL (ref 60–130)

## 2025-04-22 ENCOUNTER — RESULTS FOLLOW-UP (OUTPATIENT)
Dept: BARIATRICS | Facility: CLINIC | Age: 65
End: 2025-04-22

## 2025-04-22 DIAGNOSIS — E50.9 VITAMIN A DEFICIENCY: Primary | ICD-10-CM

## 2025-04-22 DIAGNOSIS — R79.0 LOW FERRITIN: ICD-10-CM
